# Patient Record
Sex: MALE | Race: WHITE | HISPANIC OR LATINO | URBAN - METROPOLITAN AREA
[De-identification: names, ages, dates, MRNs, and addresses within clinical notes are randomized per-mention and may not be internally consistent; named-entity substitution may affect disease eponyms.]

---

## 2018-01-26 ENCOUNTER — INPATIENT (INPATIENT)
Facility: HOSPITAL | Age: 83
LOS: 3 days | Discharge: HOME | DRG: 195 | End: 2018-01-30
Attending: HOSPITALIST | Admitting: INTERNAL MEDICINE
Payer: MEDICARE

## 2018-01-26 VITALS
TEMPERATURE: 99 F | HEART RATE: 88 BPM | DIASTOLIC BLOOD PRESSURE: 78 MMHG | RESPIRATION RATE: 18 BRPM | OXYGEN SATURATION: 96 % | SYSTOLIC BLOOD PRESSURE: 143 MMHG

## 2018-01-26 LAB
BASOPHILS # BLD AUTO: 0 K/UL — SIGNIFICANT CHANGE UP (ref 0–0.2)
BASOPHILS NFR BLD AUTO: 0.1 % — SIGNIFICANT CHANGE UP (ref 0–2)
EOSINOPHIL # BLD AUTO: 0 K/UL — SIGNIFICANT CHANGE UP (ref 0–0.5)
EOSINOPHIL NFR BLD AUTO: 0.1 % — SIGNIFICANT CHANGE UP (ref 0–6)
GAS PNL BLDV: SIGNIFICANT CHANGE UP
HCT VFR BLD CALC: 36.8 % — LOW (ref 39–50)
HGB BLD-MCNC: 12.7 G/DL — LOW (ref 13–17)
LYMPHOCYTES # BLD AUTO: 3.2 K/UL — SIGNIFICANT CHANGE UP (ref 1–3.3)
LYMPHOCYTES # BLD AUTO: 38.9 % — SIGNIFICANT CHANGE UP (ref 13–44)
MCHC RBC-ENTMCNC: 32.5 PG — SIGNIFICANT CHANGE UP (ref 27–34)
MCHC RBC-ENTMCNC: 34.5 GM/DL — SIGNIFICANT CHANGE UP (ref 32–36)
MCV RBC AUTO: 94.2 FL — SIGNIFICANT CHANGE UP (ref 80–100)
MONOCYTES # BLD AUTO: 1.3 K/UL — HIGH (ref 0–0.9)
MONOCYTES NFR BLD AUTO: 15.9 % — HIGH (ref 2–14)
NEUTROPHILS # BLD AUTO: 3.7 K/UL — SIGNIFICANT CHANGE UP (ref 1.8–7.4)
NEUTROPHILS NFR BLD AUTO: 45 % — SIGNIFICANT CHANGE UP (ref 43–77)
PLATELET # BLD AUTO: 180 K/UL — SIGNIFICANT CHANGE UP (ref 150–400)
RBC # BLD: 3.91 M/UL — LOW (ref 4.2–5.8)
RBC # FLD: 12.3 % — SIGNIFICANT CHANGE UP (ref 10.3–14.5)
WBC # BLD: 8.2 K/UL — SIGNIFICANT CHANGE UP (ref 3.8–10.5)
WBC # FLD AUTO: 8.2 K/UL — SIGNIFICANT CHANGE UP (ref 3.8–10.5)

## 2018-01-26 PROCEDURE — 99285 EMERGENCY DEPT VISIT HI MDM: CPT | Mod: 25

## 2018-01-26 PROCEDURE — 71046 X-RAY EXAM CHEST 2 VIEWS: CPT | Mod: 26

## 2018-01-26 PROCEDURE — 93010 ELECTROCARDIOGRAM REPORT: CPT

## 2018-01-26 RX ORDER — DEXTROSE 50 % IN WATER 50 %
50 SYRINGE (ML) INTRAVENOUS ONCE
Qty: 0 | Refills: 0 | Status: COMPLETED | OUTPATIENT
Start: 2018-01-26 | End: 2018-01-26

## 2018-01-26 RX ADMIN — Medication 50 MILLILITER(S): at 23:00

## 2018-01-26 NOTE — ED PROVIDER NOTE - NS ED ROS FT
Constitutional: +fever, +chills.  Eyes: no visual changes.  ENMT: no sore throat.  CV: no chest pain.  Resp: +cough, no shortness of breath.  GI: no abdominal pain, no nausea, no vomiting, no diarrhea.  : no dysuria, +hematuria.  MSK: no back pain, no neck pain.  Skin: no rashes.  Neuro: no headache.
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
## 2018-01-26 NOTE — ED PROVIDER NOTE - PROGRESS NOTE DETAILS
Resident: patient had episode of hypoglycemia, FSG 40. Given 1amp D50, repeat 249. Resident: despite encouragement to eat, patient still having decreased PO intake, became hypoglycemia again, finger stick 64. Will admit with flu and hypoglycemia. Repeat BG at 54 s/p D50. Hospitalist at bedside adjusting D5 gtt.

## 2018-01-26 NOTE — ED ADULT NURSE NOTE - PMH
BPH (Benign Prostatic Hypertrophy)    CAD (coronary artery disease)    High Cholesterol    HTN (Hypertension)    Hypothyroid    MI (Myocardial Infarction)

## 2018-01-26 NOTE — ED PROVIDER NOTE - SHIFT CHANGE DETAILS
Pt here with +Flu. Initially hypoglycemic given D50 and fed. Will repeat BG, awaiting UA for dispo planning
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
## 2018-01-26 NOTE — ED PROVIDER NOTE - PHYSICAL EXAMINATION
Resident: Gen: well appearing, of stated age, no acute distress; ENT: PERRL, MMM, no uvular deviation, no tonsilar erythema; Chest: diffuse wheezes, rhonchi RLL, no retractions, rate normal, appears to breathe comfortably; Heart: RRR S1S2 No JVD +mild pitting LE edema to mid shin, b/l pulses 2+ in arms and legs; Abd: Soft non-tender, no rebound or guarding, no CVAT; Back: No spinal deformity; Ext: Moving all 4 extremities without obvious impairment to ROM, no obvious weakness; Neuro: fluid speech; Psych: No anxiety, depression or pressured speech noted; Skin: no urticaria, no diffuse rash.

## 2018-01-26 NOTE — ED ADULT NURSE NOTE - PSH
History of coronary angioplasty  with balloon angioplasty, no stent in 1993 and 1995  S/P total knee replacement  Bilateral, right knee 7/2011 and left knee 10/2011

## 2018-01-26 NOTE — ED PROVIDER NOTE - ATTENDING CONTRIBUTION TO CARE
Attending MD Thao: I personally have seen and examined this patient.  Resident note reviewed and agree on plan of care and except where noted.  See below for details.     84M with PMH including CAD s/p angio in 2011 no stents, HTN, DM, HLD, BPH presents to the ED with cough and muscle pain.  Reports he has had nonproductive cough for 4 days, associated with fever, bilateral shoulder pain, generalized muscle pain and decreased appetite and chills.  Reports wife with similar symptoms.  Denies recent travel.  Reports he is chronic prednisone 10mg daily (rheum).  Denies abdominal pain, nausea, vomiting, diarrhea, blood in stools. Denies dysuria, hematuria, change in urinary habits including frequency, urgency. Denies chest pain, palpitations.  On exam, NAD, head NCAT, PERRL, FROM at neck, no tenderness to palpation or stepoffs along length of spine, lungs RLL crackles, scattered rhonchi, good inspiratory effort, +S1S2, no m/r/g, abdomen soft with +BS, NT, ND, no CVAT, moving all extremities with 5/5 strength bilateral upper and lower extremities, good and equal  strength bilaterally, +trace pitting edema bilaterally, no rash; A/P: 84M with cough, Ddx viral illness, PNA, will obtain CXR, RVP, labs, EKG, reassess

## 2018-01-27 DIAGNOSIS — E11.649 TYPE 2 DIABETES MELLITUS WITH HYPOGLYCEMIA WITHOUT COMA: ICD-10-CM

## 2018-01-27 DIAGNOSIS — I10 ESSENTIAL (PRIMARY) HYPERTENSION: ICD-10-CM

## 2018-01-27 DIAGNOSIS — E16.2 HYPOGLYCEMIA, UNSPECIFIED: ICD-10-CM

## 2018-01-27 DIAGNOSIS — Z29.9 ENCOUNTER FOR PROPHYLACTIC MEASURES, UNSPECIFIED: ICD-10-CM

## 2018-01-27 DIAGNOSIS — E03.9 HYPOTHYROIDISM, UNSPECIFIED: ICD-10-CM

## 2018-01-27 DIAGNOSIS — J10.1 INFLUENZA DUE TO OTHER IDENTIFIED INFLUENZA VIRUS WITH OTHER RESPIRATORY MANIFESTATIONS: ICD-10-CM

## 2018-01-27 DIAGNOSIS — E78.5 HYPERLIPIDEMIA, UNSPECIFIED: ICD-10-CM

## 2018-01-27 DIAGNOSIS — J11.1 INFLUENZA DUE TO UNIDENTIFIED INFLUENZA VIRUS WITH OTHER RESPIRATORY MANIFESTATIONS: ICD-10-CM

## 2018-01-27 DIAGNOSIS — N40.0 BENIGN PROSTATIC HYPERPLASIA WITHOUT LOWER URINARY TRACT SYMPTOMS: ICD-10-CM

## 2018-01-27 DIAGNOSIS — I25.10 ATHEROSCLEROTIC HEART DISEASE OF NATIVE CORONARY ARTERY WITHOUT ANGINA PECTORIS: ICD-10-CM

## 2018-01-27 LAB
ANION GAP SERPL CALC-SCNC: 12 MMOL/L — SIGNIFICANT CHANGE UP (ref 5–17)
APPEARANCE UR: CLEAR — SIGNIFICANT CHANGE UP
BASOPHILS # BLD AUTO: 0.01 K/UL — SIGNIFICANT CHANGE UP (ref 0–0.2)
BASOPHILS NFR BLD AUTO: 0.2 % — SIGNIFICANT CHANGE UP (ref 0–2)
BILIRUB UR-MCNC: NEGATIVE — SIGNIFICANT CHANGE UP
BUN SERPL-MCNC: 19 MG/DL — SIGNIFICANT CHANGE UP (ref 7–23)
BURR CELLS BLD QL SMEAR: SIGNIFICANT CHANGE UP
CALCIUM SERPL-MCNC: 8.3 MG/DL — LOW (ref 8.4–10.5)
CHLORIDE SERPL-SCNC: 104 MMOL/L — SIGNIFICANT CHANGE UP (ref 96–108)
CO2 SERPL-SCNC: 22 MMOL/L — SIGNIFICANT CHANGE UP (ref 22–31)
COLOR SPEC: SIGNIFICANT CHANGE UP
CREAT SERPL-MCNC: 1.12 MG/DL — SIGNIFICANT CHANGE UP (ref 0.5–1.3)
DIFF PNL FLD: ABNORMAL
ELLIPTOCYTES BLD QL SMEAR: SIGNIFICANT CHANGE UP
EOSINOPHIL # BLD AUTO: 0.02 K/UL — SIGNIFICANT CHANGE UP (ref 0–0.5)
EOSINOPHIL NFR BLD AUTO: 0.4 % — SIGNIFICANT CHANGE UP (ref 0–6)
EPI CELLS # UR: SIGNIFICANT CHANGE UP /HPF
FLUAV H1 2009 PAND RNA SPEC QL NAA+PROBE: DETECTED
GLUCOSE BLDC GLUCOMTR-MCNC: 126 MG/DL — HIGH (ref 70–99)
GLUCOSE BLDC GLUCOMTR-MCNC: 146 MG/DL — HIGH (ref 70–99)
GLUCOSE BLDC GLUCOMTR-MCNC: 147 MG/DL — HIGH (ref 70–99)
GLUCOSE SERPL-MCNC: 56 MG/DL — LOW (ref 70–99)
GLUCOSE UR QL: NEGATIVE — SIGNIFICANT CHANGE UP
HBA1C BLD-MCNC: 6.4 % — HIGH (ref 4–5.6)
HCT VFR BLD CALC: 36.3 % — LOW (ref 39–50)
HGB BLD-MCNC: 11.8 G/DL — LOW (ref 13–17)
IMM GRANULOCYTES NFR BLD AUTO: 0.2 % — SIGNIFICANT CHANGE UP (ref 0–1.5)
KETONES UR-MCNC: NEGATIVE — SIGNIFICANT CHANGE UP
LEUKOCYTE ESTERASE UR-ACNC: ABNORMAL
LYMPHOCYTES # BLD AUTO: 2.08 K/UL — SIGNIFICANT CHANGE UP (ref 1–3.3)
LYMPHOCYTES # BLD AUTO: 41.3 % — SIGNIFICANT CHANGE UP (ref 13–44)
MAGNESIUM SERPL-MCNC: 2 MG/DL — SIGNIFICANT CHANGE UP (ref 1.6–2.6)
MANUAL SMEAR VERIFICATION: SIGNIFICANT CHANGE UP
MCHC RBC-ENTMCNC: 30.2 PG — SIGNIFICANT CHANGE UP (ref 27–34)
MCHC RBC-ENTMCNC: 32.5 GM/DL — SIGNIFICANT CHANGE UP (ref 32–36)
MCV RBC AUTO: 92.8 FL — SIGNIFICANT CHANGE UP (ref 80–100)
MONOCYTES # BLD AUTO: 1.01 K/UL — HIGH (ref 0–0.9)
MONOCYTES NFR BLD AUTO: 20 % — HIGH (ref 2–14)
NEUTROPHILS # BLD AUTO: 1.91 K/UL — SIGNIFICANT CHANGE UP (ref 1.8–7.4)
NEUTROPHILS NFR BLD AUTO: 37.9 % — LOW (ref 43–77)
NITRITE UR-MCNC: NEGATIVE — SIGNIFICANT CHANGE UP
PH UR: 6 — SIGNIFICANT CHANGE UP (ref 5–8)
PHOSPHATE SERPL-MCNC: 2.8 MG/DL — SIGNIFICANT CHANGE UP (ref 2.5–4.5)
PLAT MORPH BLD: NORMAL — SIGNIFICANT CHANGE UP
PLATELET # BLD AUTO: 189 K/UL — SIGNIFICANT CHANGE UP (ref 150–400)
POIKILOCYTOSIS BLD QL AUTO: SIGNIFICANT CHANGE UP
POTASSIUM SERPL-MCNC: 4 MMOL/L — SIGNIFICANT CHANGE UP (ref 3.5–5.3)
POTASSIUM SERPL-SCNC: 4 MMOL/L — SIGNIFICANT CHANGE UP (ref 3.5–5.3)
PROT UR-MCNC: SIGNIFICANT CHANGE UP
RAPID RVP RESULT: DETECTED
RBC # BLD: 3.91 M/UL — LOW (ref 4.2–5.8)
RBC # FLD: 14.1 % — SIGNIFICANT CHANGE UP (ref 10.3–14.5)
RBC BLD AUTO: ABNORMAL
RBC CASTS # UR COMP ASSIST: SIGNIFICANT CHANGE UP /HPF (ref 0–2)
SODIUM SERPL-SCNC: 138 MMOL/L — SIGNIFICANT CHANGE UP (ref 135–145)
SP GR SPEC: 1.01 — SIGNIFICANT CHANGE UP (ref 1.01–1.02)
UROBILINOGEN FLD QL: NEGATIVE — SIGNIFICANT CHANGE UP
WBC # BLD: 5.04 K/UL — SIGNIFICANT CHANGE UP (ref 3.8–10.5)
WBC # FLD AUTO: 5.04 K/UL — SIGNIFICANT CHANGE UP (ref 3.8–10.5)
WBC UR QL: SIGNIFICANT CHANGE UP /HPF (ref 0–5)

## 2018-01-27 PROCEDURE — 99223 1ST HOSP IP/OBS HIGH 75: CPT | Mod: GC

## 2018-01-27 PROCEDURE — 99233 SBSQ HOSP IP/OBS HIGH 50: CPT | Mod: GC

## 2018-01-27 PROCEDURE — 99222 1ST HOSP IP/OBS MODERATE 55: CPT

## 2018-01-27 PROCEDURE — 12345: CPT | Mod: NC

## 2018-01-27 RX ORDER — DEXTROSE 10 % IN WATER 10 %
1000 INTRAVENOUS SOLUTION INTRAVENOUS
Qty: 0 | Refills: 0 | Status: DISCONTINUED | OUTPATIENT
Start: 2018-01-27 | End: 2018-01-27

## 2018-01-27 RX ORDER — IPRATROPIUM/ALBUTEROL SULFATE 18-103MCG
3 AEROSOL WITH ADAPTER (GRAM) INHALATION EVERY 6 HOURS
Qty: 0 | Refills: 0 | Status: DISCONTINUED | OUTPATIENT
Start: 2018-01-27 | End: 2018-01-30

## 2018-01-27 RX ORDER — HEPARIN SODIUM 5000 [USP'U]/ML
5000 INJECTION INTRAVENOUS; SUBCUTANEOUS EVERY 8 HOURS
Qty: 0 | Refills: 0 | Status: DISCONTINUED | OUTPATIENT
Start: 2018-01-27 | End: 2018-01-30

## 2018-01-27 RX ORDER — DEXTROSE 10 % IN WATER 10 %
500 INTRAVENOUS SOLUTION INTRAVENOUS
Qty: 0 | Refills: 0 | Status: DISCONTINUED | OUTPATIENT
Start: 2018-01-27 | End: 2018-01-27

## 2018-01-27 RX ORDER — ATORVASTATIN CALCIUM 80 MG/1
10 TABLET, FILM COATED ORAL AT BEDTIME
Qty: 0 | Refills: 0 | Status: DISCONTINUED | OUTPATIENT
Start: 2018-01-27 | End: 2018-01-30

## 2018-01-27 RX ORDER — DEXTROSE 50 % IN WATER 50 %
1 SYRINGE (ML) INTRAVENOUS ONCE
Qty: 0 | Refills: 0 | Status: DISCONTINUED | OUTPATIENT
Start: 2018-01-27 | End: 2018-01-30

## 2018-01-27 RX ORDER — SODIUM CHLORIDE 9 MG/ML
1000 INJECTION, SOLUTION INTRAVENOUS
Qty: 0 | Refills: 0 | Status: DISCONTINUED | OUTPATIENT
Start: 2018-01-27 | End: 2018-01-27

## 2018-01-27 RX ORDER — GLUCAGON INJECTION, SOLUTION 0.5 MG/.1ML
1 INJECTION, SOLUTION SUBCUTANEOUS ONCE
Qty: 0 | Refills: 0 | Status: DISCONTINUED | OUTPATIENT
Start: 2018-01-27 | End: 2018-01-30

## 2018-01-27 RX ORDER — FINASTERIDE 5 MG/1
5 TABLET, FILM COATED ORAL DAILY
Qty: 0 | Refills: 0 | Status: DISCONTINUED | OUTPATIENT
Start: 2018-01-27 | End: 2018-01-30

## 2018-01-27 RX ORDER — TAMSULOSIN HYDROCHLORIDE 0.4 MG/1
0.8 CAPSULE ORAL AT BEDTIME
Qty: 0 | Refills: 0 | Status: DISCONTINUED | OUTPATIENT
Start: 2018-01-27 | End: 2018-01-30

## 2018-01-27 RX ORDER — SODIUM CHLORIDE 9 MG/ML
1000 INJECTION, SOLUTION INTRAVENOUS
Qty: 0 | Refills: 0 | Status: DISCONTINUED | OUTPATIENT
Start: 2018-01-27 | End: 2018-01-28

## 2018-01-27 RX ORDER — DEXTROSE 50 % IN WATER 50 %
1 SYRINGE (ML) INTRAVENOUS ONCE
Qty: 0 | Refills: 0 | Status: COMPLETED | OUTPATIENT
Start: 2018-01-27 | End: 2018-01-27

## 2018-01-27 RX ORDER — GLIMEPIRIDE 1 MG
1 TABLET ORAL
Qty: 0 | Refills: 0 | COMMUNITY

## 2018-01-27 RX ORDER — DEXTROSE 50 % IN WATER 50 %
25 SYRINGE (ML) INTRAVENOUS ONCE
Qty: 0 | Refills: 0 | Status: DISCONTINUED | OUTPATIENT
Start: 2018-01-27 | End: 2018-01-30

## 2018-01-27 RX ORDER — DEXTROSE 50 % IN WATER 50 %
50 SYRINGE (ML) INTRAVENOUS ONCE
Qty: 0 | Refills: 0 | Status: COMPLETED | OUTPATIENT
Start: 2018-01-27 | End: 2018-01-27

## 2018-01-27 RX ORDER — FAMOTIDINE 10 MG/ML
20 INJECTION INTRAVENOUS AT BEDTIME
Qty: 0 | Refills: 0 | Status: DISCONTINUED | OUTPATIENT
Start: 2018-01-27 | End: 2018-01-30

## 2018-01-27 RX ORDER — LEVOTHYROXINE SODIUM 125 MCG
125 TABLET ORAL DAILY
Qty: 0 | Refills: 0 | Status: DISCONTINUED | OUTPATIENT
Start: 2018-01-27 | End: 2018-01-30

## 2018-01-27 RX ORDER — DEXTROSE 50 % IN WATER 50 %
12.5 SYRINGE (ML) INTRAVENOUS ONCE
Qty: 0 | Refills: 0 | Status: DISCONTINUED | OUTPATIENT
Start: 2018-01-27 | End: 2018-01-30

## 2018-01-27 RX ORDER — ASPIRIN/CALCIUM CARB/MAGNESIUM 324 MG
81 TABLET ORAL DAILY
Qty: 0 | Refills: 0 | Status: DISCONTINUED | OUTPATIENT
Start: 2018-01-27 | End: 2018-01-30

## 2018-01-27 RX ORDER — ACETAMINOPHEN 500 MG
650 TABLET ORAL EVERY 6 HOURS
Qty: 0 | Refills: 0 | Status: DISCONTINUED | OUTPATIENT
Start: 2018-01-27 | End: 2018-01-30

## 2018-01-27 RX ADMIN — Medication 50 MILLILITER(S): at 04:40

## 2018-01-27 RX ADMIN — Medication 81 MILLIGRAM(S): at 14:34

## 2018-01-27 RX ADMIN — Medication 1 DOSE(S): at 06:20

## 2018-01-27 RX ADMIN — Medication 40 MILLILITER(S): at 14:35

## 2018-01-27 RX ADMIN — ATORVASTATIN CALCIUM 10 MILLIGRAM(S): 80 TABLET, FILM COATED ORAL at 22:42

## 2018-01-27 RX ADMIN — Medication 30 MILLIGRAM(S): at 06:57

## 2018-01-27 RX ADMIN — Medication 50 MILLILITER(S): at 06:40

## 2018-01-27 RX ADMIN — SODIUM CHLORIDE 125 MILLILITER(S): 9 INJECTION, SOLUTION INTRAVENOUS at 03:36

## 2018-01-27 RX ADMIN — Medication 5 MILLIGRAM(S): at 06:02

## 2018-01-27 RX ADMIN — Medication 30 MILLIGRAM(S): at 17:56

## 2018-01-27 RX ADMIN — FINASTERIDE 5 MILLIGRAM(S): 5 TABLET, FILM COATED ORAL at 14:34

## 2018-01-27 RX ADMIN — TAMSULOSIN HYDROCHLORIDE 0.8 MILLIGRAM(S): 0.4 CAPSULE ORAL at 22:42

## 2018-01-27 RX ADMIN — HEPARIN SODIUM 5000 UNIT(S): 5000 INJECTION INTRAVENOUS; SUBCUTANEOUS at 14:34

## 2018-01-27 RX ADMIN — HEPARIN SODIUM 5000 UNIT(S): 5000 INJECTION INTRAVENOUS; SUBCUTANEOUS at 22:42

## 2018-01-27 RX ADMIN — Medication 125 MICROGRAM(S): at 06:02

## 2018-01-27 NOTE — ED ADULT NURSE REASSESSMENT NOTE - TEMPLATE LIST FOR HEAD TO TOE ASSESSMENT
Communicable/Infectious

## 2018-01-27 NOTE — H&P ADULT - PROBLEM SELECTOR PLAN 3
Monitor vitals  SBP in the 110s.   Will hold with AM HTN meds (Amlodipine/Atenolol/Valsartan) pending improved hemodynamic stability. Monitor vitals  SBP in the 110s. concern for sepsis in setting of hypoglycemia, however BP stable,  Will hold with AM HTN meds (Amlodipine/Atenolol/Valsartan) pending improved hemodynamic stability. Monitor vitals  SBP in the 110s. concern for sepsis in setting of hypoglycemia, however BP stable, not tachycardic, no gross leukocytosis.  Will hold with AM HTN meds (Amlodipine/Atenolol/Valsartan) pending improved hemodynamic stability.

## 2018-01-27 NOTE — CONSULT NOTE ADULT - ATTENDING COMMENTS
84M with Hx Type-2 DM CAD and BPH on prednisone 5mg GD presented to ED with flu like symptoms 4 days found to have IFA+ and hypoglycemia required D50 x 2 amp IV push and starting on D10 gtt low dose 30 -50 cc/hr. He took Metformin 500 mg and Glimepiride 2 mg BID last evening with poor appetite X 4 days in setting of reduced GFR < 60. NO significant hypoglycemic symptoms noted.
elderly patient with hypoglycemia in the setting of having the flu and on amaryl last dose yesterday in am  pt is elderly so would no aim for tight control  fs have been normal on d10 can change to d5 and cont to monitor effect of amaryl should not last more than 48 hours  pt has good appetite  cont to monitor fs and can discharge on metformin  discussed with pt and family that amaryl is not a safe medication for him

## 2018-01-27 NOTE — ED ADULT NURSE REASSESSMENT NOTE - NS ED NURSE REASSESS COMMENT FT1
md pozo aware of patients trending finger sticks- patient to be admitted
I spoke to Medicine EMILY Thomas to discuss patient's persistent hypoglycemia. The D10 infusion had been ordered for 5 hours only, however despite D10 IV and PO intake, patient POCT blood glucose remaining at ~80. EMILY Thomas states to continue D10 infusion and q2hr finger sticks.
MD Greer states next finger stick to be drawn at 0600
MD Lowe aware that carboxyhemoglobin was not obtained and that there was an error in the lab. She states she does not want lab.
MICU resident came to ED stating that they have to trial D10 for 1 hour. Next fingerstick due at 0740. Will endorse time to receiving RN.
Patient was given food.
Patient was hypoglycemic again. MD ordered stat d50. Flushed IV- no redness/swelling prior or post infiltration. Admitting MD aware of patients BGL and still at bedside.
Patients lab work revealed hypoglycemia. he denies dizziness/lightheadedness. 1 amp d50 administered emergently by HOLLY Ontiveros. Will repeat serial BGLs.
Repeat POCT blood glucose 79. Patient resting comfortably. Will repeat POCT blood glucose in 2 hours (q2 hours x4 hours) as per Dr Greer's order. D10 infusing. Will provide breakfast when trays arrive.
Repeat POCT blood glucose: 84
VJ Michael aware that Nursing supervisor states current FS orders are not appropriate for the floor. MD states the orders  are appropriate. Orders will be carried out while in ED. MDs and NP aware that it is a possibility that the floor will not be able to accommodate the frequency of fingersticks. Next FS due at 0740. Angelica quintanilla RN aware.
admitting MD at bedside
meal provided
patient had FS of 56. at 0535. Given 1 amp d50 at 0540. Repeat FS 15 mins later 167. BGL not crossing over from machine.
repeat FS after oral glucose is 74. Patient was changed to d10. He is alert, denies N/V/dizziness/diaphoresis.
repeat FS was 65. MD Greer was called immediately. Hypoglycemia protocol initiated, and patient given oral glucose. She changed order to D10. Will repeat finger sticks for patient. Pharmacy called for D10 order. Will re evaluate patients FS as per MD order.
s/w lab regarding patients BGL not crossing over- states they are not involved in that process.
Received report from night RN Ally Cuellar. Patient admitted to med/surg floor, MICU consult in progress regarding patients hypoglycemia, will repeat finger stick at 0740. D10 infusing. Awaiting bed assignment. Patient currently resting comfortably, A&Ox4. Reporting mild body aches. Droplet precautions maintained. Son at bedside.

## 2018-01-27 NOTE — CONSULT NOTE ADULT - ASSESSMENT
85 y/o M PMH HTN, HLD, DM2 on oral agents, hypothyroidism presents with hypoglycemia in setting of sulfonylurea use with reduced po intake

## 2018-01-27 NOTE — H&P ADULT - HISTORY OF PRESENT ILLNESS
83 yo man with PM of CAD (s/p angioplasty no stent 2011), HTN, HLD, BPH, on chronic prednisone presenting from home in setting of cough, fever, body aches. 85 yo man with PM of CAD (s/p angioplasty no stent), HTN, HLD, BPH, on chronic prednisone presenting from home in setting of cough, fever, body aches and generalized fatigue in the past 4 days. Patient endorses symptoms of fevers (as high as 101) chills, sweats, nasal congestion sore throat and fairly nonproductive cough, myalgias the past 4 days. Patient states that his wife has been under the weather and presented to the ED as well earlier in the evening with patient and she was found with flu. Patient can not recall any other sick contacts besides going to his regular doctor's visits. Patient received flu shot in 10/2017. Patient also endorses a decreased appetite. Patient denies n/v/diarhhea. Took Metformin and Glimepiride BID prior to coming the ED

## 2018-01-27 NOTE — H&P ADULT - PROBLEM SELECTOR PLAN 2
Influenza A detected on RVP  Initiate Tamiflu 30 mg BID based on CrCl with approximation of patient's weight and height  Primary day team to ensure adequate dosing pending patient's official CrCl  Supportive care with antipyretics and antitussives prn Influenza A detected on RVP  Initiate Tamiflu 30 mg BID based on CrCl with approximation of patient's weight and height  Primary day team to ensure adequate dosing pending patient's official CrCl  Supportive care with antipyretics, antitussives, nebs prn  Of note patient on Prednisone 5 mg for chronic back pain 2/2 muscle issue yet no rheumatologic diagnosis. Will continue for now  Primary day team to obtain collateral from PMD

## 2018-01-27 NOTE — H&P ADULT - ATTENDING COMMENTS
Patient was previously unknown to me. Patient was assigned to me by hospitalist in charge. My involvement in this case consisted of initial history, physical and management plan. Primary day team to assume care in AM. Case discussed in detail with overnight medicine NP, Patient was previously unknown to me. Patient was assigned to me by hospitalist in charge. My involvement in this case consisted of initial history, physical and management plan. Primary day team to assume care in AM. Case discussed in detail with overnight medicine NP, Fernanda [id number].

## 2018-01-27 NOTE — H&P ADULT - NSHPPHYSICALEXAM_GEN_ALL_CORE
Vital Signs Last 24 Hrs  T(C): 37.4 (27 Jan 2018 03:16), Max: 37.4 (27 Jan 2018 03:16)  T(F): 99.4 (27 Jan 2018 03:16), Max: 99.4 (27 Jan 2018 03:16)  HR: 64 (27 Jan 2018 03:16) (64 - 88)  BP: 116/72 (27 Jan 2018 03:16) (116/72 - 143/78)  BP(mean): --  RR: 18 (27 Jan 2018 03:16) (18 - 18)  SpO2: 96% (27 Jan 2018 03:16) (95% - 98%) Vital Signs Last 24 Hrs  T(C): 37.4 (27 Jan 2018 03:16), Max: 37.4 (27 Jan 2018 03:16)  T(F): 99.4 (27 Jan 2018 03:16), Max: 99.4 (27 Jan 2018 03:16)  HR: 64 (27 Jan 2018 03:16) (64 - 88)  BP: 116/72 (27 Jan 2018 03:16) (116/72 - 143/78)  BP(mean): --  RR: 18 (27 Jan 2018 03:16) (18 - 18)  SpO2: 96% (27 Jan 2018 03:16) (95% - 98%)    CAPILLARY BLOOD GLUCOSE      POCT at bedside 58 ~0430 AM. Given Amp D50 x1    POCT Blood Glucose.: 64 mg/dL (27 Jan 2018 03:14)  POCT Blood Glucose.: 172 mg/dL (26 Jan 2018 23:23)  POCT Blood Glucose.: 246 mg/dL (26 Jan 2018 22:49)  POCT Blood Glucose.: >600 mg/dL (26 Jan 2018 22:46)  POCT Blood Glucose.: 40 mg/dL (26 Jan 2018 22:43)        PHYSICAL EXAM:  GENERAL: NAD, well-groomed, well-developed  HEAD:  Atraumatic, Normocephalic  EYES: EOMI, PERRLA, conjunctiva and sclera clear  ENMT: No tonsillar erythema, exudates, or enlargement; Moist mucous membranes, Good dentition, No lesions  NECK: Supple, No JVD, Normal thyroid  NERVOUS SYSTEM:  Alert & Oriented X3, Good concentration; Motor Strength 5/5 B/L upper and lower extremities  CHEST/LUNG: Respirations non labored, no use of accessory muscles. Scant ronchi, no appreciable wheezing. Intermittent cough  HEART: Regular rate and rhythm; No murmurs, rubs, or gallops  ABDOMEN: Soft, Nontender, Nondistended; Bowel sounds present  EXTREMITIES:  2+ Peripheral Pulses, No clubbing, cyanosis, or trace LE edema  LYMPH: No lymphadenopathy noted  SKIN: No rashes or lesions

## 2018-01-27 NOTE — H&P ADULT - NSHPLABSRESULTS_GEN_ALL_CORE
Personally reviewed labs and noted in detail below:    Personally reviwed EKG    Personally reviewed CXR no appreiciable consolidations Personally reviewed labs and noted in detail below:    Personally reviewed EKG: SR 71 bpm comparable to 1/2012 EKG    Personally reviewed CXR no appreciable consolidations

## 2018-01-27 NOTE — H&P ADULT - ASSESSMENT
83 yo man with PM of CAD (s/p angioplasty no stent), HTN, HLD, BPH, on chronic prednisone presenting from home in setting of cough, fever, body aches and generalized fatigue in the past 4 days found with Influenza A and with hypoglycemia.

## 2018-01-27 NOTE — CONSULT NOTE ADULT - PROBLEM SELECTOR RECOMMENDATION 9
- 2/2 no PO intake over last 4 days with persistent adherence to metformin/glyburide regimen irregardless of PO intake.    - start D10 @40-50c/hr, check FSG q1 x 2  - if patient required increasing D10 levels at that point, may need ICU monitoring but we will follow up FSG over next 1-2 hours  - hold all diabetics medications at this time  - encourage full diet  - hypoglycemia protocol in effect - 2/2 no PO intake over last 4 days with persistent adherence to metformin/glyburide regimen irregardless of PO intake.    - start D10 @40-50c/hr, check FSG q1 x 2  - if patient requires increasing D10 levels at that point, may need ICU monitoring but we will follow up FSG over next 1-2 hours  - hold all diabetics medications at this time  - encourage full diet  - hypoglycemia protocol in effect

## 2018-01-27 NOTE — CONSULT NOTE ADULT - SUBJECTIVE AND OBJECTIVE BOX
HPI:  83 yo man with PM of CAD (s/p angioplasty no stent), HTN, HLD, DM2, BPH, on chronic prednisone presenting from home in setting of cough, fever, body aches and generalized fatigue in the past 4 days. Patient endorses symptoms of fevers (as high as 101) chills, sweats, nasal congestion sore throat and fairly nonproductive cough, myalgias the past 4 days. Patient states that his wife has been under the weather and presented to the ED as well earlier in the evening with patient and she was found with flu. Patient can not recall any other sick contacts besides going to his regular doctor's visits. Patient received flu shot in 10/2017. Patient also endorses a decreased appetite. Patient denies n/v/diarhhea. Took Metformin and Glimepiride BID prior to coming the ED.    Endocrine History:  Patient was found to be hypoglycemic to 40's on presentation to the ED, not symptomatic. He was treated with 1 amp of D50 and then developed hyperglycemia. He was started on D5 and then D10 overnight. He states that he was diagnosed with DM2 last year and was started on metformin 500 mg BID glimeperide 2 mg BID by his PMD. HbA1c here is 6.4. He states his last dose of glimepiride was yesterday morning at 9:30 AM. For the last 4 days, he was not feeling well and was not eating. Appetite now improved and he is eating at bedside. As per patient, most recent FS was 177. Saw optho 3 months ago, no retinopathy. Patient with CKD, stage 2. Reports neuropathy in the feet for the last 5-6 months. States he only checks a FS once a month, in the AM, usually in the 80's. Reports eating a reasonable amount of rice and pasta. Drinks orange juice.     Patient also with hypothyroidism for years. States he takes levothyroxine at home, but is unsure of the dose. No neck pain, dysphagia, dysphonia, chest pain, palpitations, abdominal pain, nausea, vomiting, diarrhea, constipation, weight changes, skin or hair changes.      PAST MEDICAL & SURGICAL HISTORY:  CAD (coronary artery disease)  Hypothyroid  BPH (Benign Prostatic Hypertrophy)  MI (Myocardial Infarction)  HTN (Hypertension)  High Cholesterol  S/P total knee replacement: Bilateral, right knee 7/2011 and left knee 10/2011  History of coronary angioplasty: with balloon angioplasty, no stent in 1993 and 1995      FAMILY HISTORY:  No family history of DM2      Social History:  No cigarette or alcohol use  Lives with wife    Outpatient Medications:  · 	amLODIPine 10 mg oral tablet: 1 tab(s) orally once a day, Last Dose Taken:    · 	atenolol 25 mg oral tablet: 1 tab(s) orally once a day, Last Dose Taken:    · 	atorvastatin 10 mg oral tablet: 1 tab(s) orally once a day, Last Dose Taken:    · 	azelastine 205.5 mcg/inh (0.15%) nasal spray: 1 spray(s) nasal 2 times a day, As Needed, Last Dose Taken:    · 	famotidine 40 mg oral tablet: 1 tab(s) orally once a day (at bedtime), As Needed, Last Dose Taken:    · 	finasteride 5 mg oral tablet: 1 tab(s) orally once a day, Last Dose Taken:    · 	fluticasone 50 mcg/inh nasal spray: 2 spray(s) nasal once a day, As Needed, Last Dose Taken:    · 	levocetirizine 5 mg oral tablet: 1 tab(s) orally once a day (in the evening), As Needed, Last Dose Taken:    · 	levothyroxine 125 mcg (0.125 mg) oral tablet: 1 tab(s) orally once a day, Last Dose Taken:    · 	tamsulosin 0.4 mg oral capsule: 2 cap(s) orally once a day (at bedtime), Last Dose Taken:    · 	valsartan 320 mg oral tablet: 1 tab(s) orally once a day, Last Dose Taken:    · 	metFORMIN 500 mg oral tablet: orally once a day, Last Dose Taken:    · 	glimepiride 2 mg oral tablet: 1 tab(s) orally once a day, Last Dose Taken:    · 	Aspirin Enteric Coated 81 mg oral delayed release tablet: 1 tab(s) orally once a day, Last Dose Taken:    · 	Sindy 5 mg oral delayed release tablet: 1 tab(s) orally once a day, Last Dose Taken:      MEDICATIONS  (STANDING):  aspirin enteric coated 81 milliGRAM(s) Oral daily  atorvastatin 10 milliGRAM(s) Oral at bedtime  dextrose 10%. 500 milliLiter(s) (50 mL/Hr) IV Continuous <Continuous>  dextrose 10%. 1000 milliLiter(s) (40 mL/Hr) IV Continuous <Continuous>  dextrose 5%. 1000 milliLiter(s) (50 mL/Hr) IV Continuous <Continuous>  dextrose 50% Injectable 12.5 Gram(s) IV Push once  dextrose 50% Injectable 25 Gram(s) IV Push once  dextrose 50% Injectable 25 Gram(s) IV Push once  finasteride 5 milliGRAM(s) Oral daily  heparin  Injectable 5000 Unit(s) SubCutaneous every 8 hours  levothyroxine 125 MICROGram(s) Oral daily  oseltamivir 30 milliGRAM(s) Oral every 12 hours  predniSONE   Tablet 5 milliGRAM(s) Oral daily  tamsulosin 0.8 milliGRAM(s) Oral at bedtime    MEDICATIONS  (PRN):  acetaminophen   Tablet 650 milliGRAM(s) Oral every 6 hours PRN For Temp greater than 38 C (100.4 F)  ALBUTerol/ipratropium for Nebulization 3 milliLiter(s) Nebulizer every 6 hours PRN Shortness of Breath and/or Wheezing  dextrose Gel 1 Dose(s) Oral once PRN Blood Glucose LESS THAN 70 milliGRAM(s)/deciliter  famotidine    Tablet 20 milliGRAM(s) Oral at bedtime PRN Reflux symptoms  glucagon  Injectable 1 milliGRAM(s) IntraMuscular once PRN Glucose LESS THAN 70 milligrams/deciliter  guaiFENesin   Syrup  (Sugar-Free) 200 milliGRAM(s) Oral every 6 hours PRN Cough      Allergies  No Known Allergies          Review of Systems:  Constitutional: No fever; decreased appetite  Eyes: No blurry vision  HEENT: No pain  Cardiovascular: No chest pain, palpitations  Respiratory: No SOB, + cough  GI: No nausea, vomiting, abdominal pain  : No dysuria  Skin: no rash  Psych: no depression  Endocrine: no polyuria, polydipsia      ALL OTHER SYSTEMS REVIEWED AND NEGATIVE    PHYSICAL EXAM:  VITALS: T(C): 37.2 (01-27-18 @ 13:31)  T(F): 98.9 (01-27-18 @ 13:31), Max: 99.4 (01-27-18 @ 03:16)  HR: 62 (01-27-18 @ 13:31) (60 - 88)  BP: 149/77 (01-27-18 @ 13:31) (116/72 - 149/77)  RR:  (17 - 18)  SpO2:  (95% - 98%)  Wt(kg): --  GENERAL: NAD, well-developed  EYES: No proptosis,  anicteric  HEENT:  Atraumatic, Normocephalic  THYROID: Normal size, no palpable nodules  RESPIRATORY: Clear to auscultation bilaterally; No rales, rhonchi, wheezing  CARDIOVASCULAR: Regular rate and rhythm; No murmurs; no peripheral edema  GI: Soft, nontender, non distended, normal bowel sounds  SKIN: Dry, intact, No rashes or lesions  MUSCULOSKELETAL: Full range of motion, normal strength  PSYCH: Alert and oriented x 3, reactive affect, euthymic mood      POCT Blood Glucose.: 64 mg/dL (01-27-18 @ 03:14)  POCT Blood Glucose.: 172 mg/dL (01-26-18 @ 23:23)  POCT Blood Glucose.: 246 mg/dL (01-26-18 @ 22:49)  POCT Blood Glucose.: >600 mg/dL (01-26-18 @ 22:46)  POCT Blood Glucose.: 40 mg/dL (01-26-18 @ 22:43)                          11.8   5.04  )-----------( 189      ( 27 Jan 2018 09:02 )             36.3       01-27    138  |  104  |  19  ----------------------------<  56<L>  4.0   |  22  |  1.12    EGFR if : 70  EGFR if non : 60    Ca    8.3<L>      01-27  Mg     2.0     01-27  Phos  2.8     01-27    TPro  6.9  /  Alb  3.9  /  TBili  0.4  /  DBili  x   /  AST  24  /  ALT  12  /  AlkPhos  57  01-26      Thyroid Function Tests:      Hemoglobin A1C, Whole Blood: 6.4 % <H> [4.0 - 5.6] (01-27-18 @ 09:02)

## 2018-01-27 NOTE — H&P ADULT - PROBLEM SELECTOR PLAN 1
Patient with poor PO intake in setting of Flu and has taken Metformin 500 x1 and Glimeperide 2 mg BID prior to arrival to ED  S/P Given 1 amp D50 and Initiated on D5+NS by ED team once FS noted to be downtrending.   During assessment patient bedside FS 58 and given another amp D50 with repeat BS in 167  Monitor FS q1hr for 2 hour, q2hr for 4 hours in addition for FS qAC and qHS  Will hold oral meds and corrective SSI pending stability of FS  Continue with diet  Hypoglycemia protocol Patient with poor PO intake in setting of Flu and has taken Metformin 500 x1 and Glimeperide 2 mg BID prior to arrival to ED  S/P Given 1 amp D50 and Initiated on D5+NS by ED team once FS noted to be downtrending.   During assessment patient bedside FS 58 and given another amp D50 with repeat BS in 167  Monitor FS q1hr for 2 hour, q2hr for 4 hours in addition for FS qAC and qHS  Will hold oral meds and corrective SSI pending stability of FS  Continue with diet. Resume diabetic diet once FS stablized  Hypoglycemia protocol Patient with poor PO intake in setting of Flu and has taken Metformin 500 x1 and Glimeperide 2 mg BID prior to arrival to ED  S/P Given 1 amp D50 and Initiated on D5+NS by ED team once FS noted to be downtrending.   During my assessment of patient bedside FS 58 and given another amp D50 with repeat BS in 167  Monitor FS q1hr for 2 hour, q2hr for 4 hours in addition for FS qAC and qHS  Will hold oral meds and corrective SSI pending stability of FS  Continue with diet. Resume diabetic diet once FS stablized  Hypoglycemia protocol

## 2018-01-27 NOTE — CONSULT NOTE ADULT - ASSESSMENT
83 yo  male with PMHx of CAD, HTN, HLD, BPH, on chronic prednisone now presented with influenza A and c/b hypoglycemia.         MICU to follow for FSG and then placement.

## 2018-01-27 NOTE — CONSULT NOTE ADULT - SUBJECTIVE AND OBJECTIVE BOX
CHIEF COMPLAINT: hypoglycemia    HPI:  85 yo  male with PMHx of CAD, HTN, HLD, BPH, on chronic prednisone now presented with influenza A and c/b hypoglycemia. Pt take metformin (1x daily) and glyburide (BID) at home and has adhered to his regimen the last week but has not eaten anything 2/2 not feeling well. Today on admission, pt was noted to have FSG 40, 1 amp D50 with repeat FSG 58, given another D50 and , pstarted D5NS with increase FSG to 167. Pt repeat now 64, pt drinking juice and asymptomatic now.    Pt has had no symptoms throughout his admission, no dizziness, n/v, diaphoresis. Pt has a cough which bothers him but no other pertinent positives.    Pt denies any overdose on his meds, no extra doses taken.    PAST MEDICAL & SURGICAL HISTORY:  CAD (coronary artery disease)  Hypothyroid  BPH (Benign Prostatic Hypertrophy)  MI (Myocardial Infarction)  HTN (Hypertension)  High Cholesterol  S/P total knee replacement: Bilateral, right knee 2011 and left knee 10/2011  History of coronary angioplasty: with balloon angioplasty, no stent in  and       FAMILY HISTORY:  No pertinent family history in first degree relatives      SOCIAL HISTORY:  lives at home with his wife, no smoking, social ETOH    Allergies    No Known Allergies    Intolerances        HOME MEDICATIONS:  metformin, glyburide 2mg BID    REVIEW OF SYSTEMS:  Constitutional:+ fevers chills  HEENT: denies visual changes, hearing changes, rhinitis, odynophagia, or dysphagia  Cardiovascular: denies palpitations, chest pain, edema  Respiratory: + cough  Gastrointestinal:+ loss of appetite,  denies N/V/D, abdominal pain, hematochezia, melena  : denies dysuria, hematuria  MSK: denies weakness, joint pain  Skin: denies new rashes or masses  Psych: denies hallucinations, anxiety, depression  Neuro: denies tremors, sensory or motor deficits      OBJECTIVE:  ICU Vital Signs Last 24 Hrs  T(C): 37.2 (2018 06:09), Max: 37.4 (2018 03:16)  T(F): 98.9 (2018 06:09), Max: 99.4 (2018 03:16)  HR: 62 (2018 06:09) (62 - 88)  BP: 135/77 (2018 06:09) (116/72 - 143/78)  BP(mean): --  ABP: --  ABP(mean): --  RR: 18 (2018 06:09) (18 - 18)  SpO2: 97% (2018 06:09) (95% - 98%)        CAPILLARY BLOOD GLUCOSE    POCT at bedside 58 ~0430 AM. Given Amp D50 x1  POCT Blood Glucose.: 64 mg/dL (2018 03:14)  POCT Blood Glucose.: 172 mg/dL (2018 23:23)  POCT Blood Glucose.: 246 mg/dL (2018 22:49)  POCT Blood Glucose.: >600 mg/dL (2018 22:46)  POCT Blood Glucose.: 40 mg/dL (2018 22:43)      PHYSICAL EXAM:  	GENERAL: NAD, well-groomed, well-developed  	HEAD:  Atraumatic, Normocephalic  	EYES: EOMI, PERRLA, conjunctiva and sclera clear  	ENMT: No tonsillar erythema, exudates, or enlargement; Moist mucous membranes, Good dentition, No lesions  	NECK: Supple, No JVD, Normal thyroid  	NERVOUS SYSTEM:  Alert & Oriented X3, Good concentration; Motor Strength 5/5 B/L upper and lower extremities  	CHEST/LUNG: Respirations non labored, no use of accessory muscles. Scant ronchi, no appreciable wheezing. Intermittent cough  	HEART: Regular rate and rhythm; No murmurs, rubs, or gallops  	ABDOMEN: Soft, Nontender, Nondistended; Bowel sounds present  	EXTREMITIES:  2+ Peripheral Pulses, No clubbing, cyanosis, or trace LE edema  	LYMPH: No lymphadenopathy noted  SKIN: No rashes or lesions    HOSPITAL MEDICATIONS:  MEDICATIONS  (STANDING):  aspirin enteric coated 81 milliGRAM(s) Oral daily  atorvastatin 10 milliGRAM(s) Oral at bedtime  dextrose 10%. 500 milliLiter(s) (50 mL/Hr) IV Continuous <Continuous>  dextrose 5%. 1000 milliLiter(s) (50 mL/Hr) IV Continuous <Continuous>  dextrose 50% Injectable 12.5 Gram(s) IV Push once  dextrose 50% Injectable 25 Gram(s) IV Push once  dextrose 50% Injectable 25 Gram(s) IV Push once  finasteride 5 milliGRAM(s) Oral daily  heparin  Injectable 5000 Unit(s) SubCutaneous every 8 hours  levothyroxine 125 MICROGram(s) Oral daily  oseltamivir 30 milliGRAM(s) Oral every 12 hours  predniSONE   Tablet 5 milliGRAM(s) Oral daily  tamsulosin 0.8 milliGRAM(s) Oral at bedtime    MEDICATIONS  (PRN):  acetaminophen   Tablet 650 milliGRAM(s) Oral every 6 hours PRN For Temp greater than 38 C (100.4 F)  ALBUTerol/ipratropium for Nebulization 3 milliLiter(s) Nebulizer every 6 hours PRN Shortness of Breath and/or Wheezing  dextrose Gel 1 Dose(s) Oral once PRN Blood Glucose LESS THAN 70 milliGRAM(s)/deciliter  famotidine    Tablet 20 milliGRAM(s) Oral at bedtime PRN Reflux symptoms  glucagon  Injectable 1 milliGRAM(s) IntraMuscular once PRN Glucose LESS THAN 70 milligrams/deciliter  guaiFENesin   Syrup  (Sugar-Free) 200 milliGRAM(s) Oral every 6 hours PRN Cough      LABS:                        12.7   8.2   )-----------( 180      ( 2018 22:31 )             36.8         136  |  102  |  23  ----------------------------<  41<LL>  4.1   |  21<L>  |  1.27    Ca    8.6      2018 22:32    TPro  6.9  /  Alb  3.9  /  TBili  0.4  /  DBili  x   /  AST  24  /  ALT  12  /  AlkPhos  57        Urinalysis Basic - ( 2018 02:09 )    Color: PL Yellow / Appearance: Clear / S.011 / pH: x  Gluc: x / Ketone: Negative  / Bili: Negative / Urobili: Negative   Blood: x / Protein: Trace / Nitrite: Negative   Leuk Esterase: Moderate / RBC: 3-5 /HPF / WBC 11-25 /HPF   Sq Epi: x / Non Sq Epi: OCC /HPF / Bacteria: x        Venous Blood Gas:   @ 22:31  7.37/40/69/23/92  VBG Lactate: 1.0      MICROBIOLOGY: RVP + influenza A    RADIOLOGY:  [ x] Reviewed and interpreted by me

## 2018-01-27 NOTE — CONSULT NOTE ADULT - PROBLEM SELECTOR RECOMMENDATION 9
- patient with hypoglycemia in setting of reduced po intake combined with glimepiride use  - currently on D10 with appropriate rise in FS (last )  - recommend hold all standing insulin, will defer Octreotide  - can switch to D5 now  - once FS are persistently above 150, can resume low correction sliding scale insulin  qac and qhs  - discharge patient off of sulfonylurea; patient will remain on metformin 500 mg BID only on discharge

## 2018-01-27 NOTE — H&P ADULT - NSHPREVIEWOFSYSTEMS_GEN_ALL_CORE
REVIEW OF SYSTEMS:    CONSTITUTIONAL: SEE HPI  EYES: No eye pain, visual disturbances, or discharge  ENMT:  SEE HPI  NECK: No pain or stiffness  RESPIRATORY: SEE HPI; No shortness of breath  CARDIOVASCULAR: No chest pain, palpitations, dizziness, or leg swelling  GASTROINTESTINAL: No abdominal pain. No nausea, vomiting, or hematemesis; No diarrhea or constipation.   GENITOURINARY: No dysuria, or frequency,  NEUROLOGICAL: No headaches, memory loss, loss of strength, numbness, or tremors  SKIN: No rashes or lesions   MUSCULOSKELETAL: +Myalgias. Chronic back pain and has been prescribed Prednisone for management. No acute joint pain or swelling.  PSYCHIATRIC: No depression, anxiety  HEME/LYMPH: No easy bruising  ALLERGY AND IMMUNOLOGIC: No reactions to medications

## 2018-01-28 DIAGNOSIS — E03.9 HYPOTHYROIDISM, UNSPECIFIED: ICD-10-CM

## 2018-01-28 LAB
ANION GAP SERPL CALC-SCNC: 14 MMOL/L — SIGNIFICANT CHANGE UP (ref 5–17)
BUN SERPL-MCNC: 18 MG/DL — SIGNIFICANT CHANGE UP (ref 7–23)
CALCIUM SERPL-MCNC: 8.3 MG/DL — LOW (ref 8.4–10.5)
CHLORIDE SERPL-SCNC: 102 MMOL/L — SIGNIFICANT CHANGE UP (ref 96–108)
CO2 SERPL-SCNC: 22 MMOL/L — SIGNIFICANT CHANGE UP (ref 22–31)
CREAT SERPL-MCNC: 1.09 MG/DL — SIGNIFICANT CHANGE UP (ref 0.5–1.3)
GLUCOSE BLDC GLUCOMTR-MCNC: 136 MG/DL — HIGH (ref 70–99)
GLUCOSE BLDC GLUCOMTR-MCNC: 140 MG/DL — HIGH (ref 70–99)
GLUCOSE BLDC GLUCOMTR-MCNC: 167 MG/DL — HIGH (ref 70–99)
GLUCOSE BLDC GLUCOMTR-MCNC: 175 MG/DL — HIGH (ref 70–99)
GLUCOSE BLDC GLUCOMTR-MCNC: 177 MG/DL — HIGH (ref 70–99)
GLUCOSE BLDC GLUCOMTR-MCNC: 188 MG/DL — HIGH (ref 70–99)
GLUCOSE BLDC GLUCOMTR-MCNC: 244 MG/DL — HIGH (ref 70–99)
GLUCOSE BLDC GLUCOMTR-MCNC: 58 MG/DL — LOW (ref 70–99)
GLUCOSE BLDC GLUCOMTR-MCNC: 65 MG/DL — LOW (ref 70–99)
GLUCOSE BLDC GLUCOMTR-MCNC: 73 MG/DL — SIGNIFICANT CHANGE UP (ref 70–99)
GLUCOSE BLDC GLUCOMTR-MCNC: 79 MG/DL — SIGNIFICANT CHANGE UP (ref 70–99)
GLUCOSE BLDC GLUCOMTR-MCNC: 80 MG/DL — SIGNIFICANT CHANGE UP (ref 70–99)
GLUCOSE BLDC GLUCOMTR-MCNC: 84 MG/DL — SIGNIFICANT CHANGE UP (ref 70–99)
GLUCOSE SERPL-MCNC: 128 MG/DL — HIGH (ref 70–99)
HCT VFR BLD CALC: 38.7 % — LOW (ref 39–50)
HGB BLD-MCNC: 12.9 G/DL — LOW (ref 13–17)
MCHC RBC-ENTMCNC: 30.6 PG — SIGNIFICANT CHANGE UP (ref 27–34)
MCHC RBC-ENTMCNC: 33.3 GM/DL — SIGNIFICANT CHANGE UP (ref 32–36)
MCV RBC AUTO: 91.9 FL — SIGNIFICANT CHANGE UP (ref 80–100)
PLATELET # BLD AUTO: 172 K/UL — SIGNIFICANT CHANGE UP (ref 150–400)
POTASSIUM SERPL-MCNC: 4.3 MMOL/L — SIGNIFICANT CHANGE UP (ref 3.5–5.3)
POTASSIUM SERPL-SCNC: 4.3 MMOL/L — SIGNIFICANT CHANGE UP (ref 3.5–5.3)
RBC # BLD: 4.21 M/UL — SIGNIFICANT CHANGE UP (ref 4.2–5.8)
RBC # FLD: 13.8 % — SIGNIFICANT CHANGE UP (ref 10.3–14.5)
SODIUM SERPL-SCNC: 138 MMOL/L — SIGNIFICANT CHANGE UP (ref 135–145)
WBC # BLD: 5.57 K/UL — SIGNIFICANT CHANGE UP (ref 3.8–10.5)
WBC # FLD AUTO: 5.57 K/UL — SIGNIFICANT CHANGE UP (ref 3.8–10.5)

## 2018-01-28 PROCEDURE — 99233 SBSQ HOSP IP/OBS HIGH 50: CPT

## 2018-01-28 RX ORDER — GLUCAGON INJECTION, SOLUTION 0.5 MG/.1ML
1 INJECTION, SOLUTION SUBCUTANEOUS ONCE
Qty: 0 | Refills: 0 | Status: DISCONTINUED | OUTPATIENT
Start: 2018-01-28 | End: 2018-01-30

## 2018-01-28 RX ORDER — AMLODIPINE BESYLATE 2.5 MG/1
10 TABLET ORAL DAILY
Qty: 0 | Refills: 0 | Status: DISCONTINUED | OUTPATIENT
Start: 2018-01-28 | End: 2018-01-30

## 2018-01-28 RX ORDER — DEXTROSE 50 % IN WATER 50 %
25 SYRINGE (ML) INTRAVENOUS ONCE
Qty: 0 | Refills: 0 | Status: DISCONTINUED | OUTPATIENT
Start: 2018-01-28 | End: 2018-01-30

## 2018-01-28 RX ORDER — SODIUM CHLORIDE 9 MG/ML
1000 INJECTION, SOLUTION INTRAVENOUS
Qty: 0 | Refills: 0 | Status: DISCONTINUED | OUTPATIENT
Start: 2018-01-28 | End: 2018-01-30

## 2018-01-28 RX ORDER — INSULIN LISPRO 100/ML
VIAL (ML) SUBCUTANEOUS
Qty: 0 | Refills: 0 | Status: DISCONTINUED | OUTPATIENT
Start: 2018-01-28 | End: 2018-01-30

## 2018-01-28 RX ORDER — INSULIN LISPRO 100/ML
2 VIAL (ML) SUBCUTANEOUS ONCE
Qty: 0 | Refills: 0 | Status: COMPLETED | OUTPATIENT
Start: 2018-01-28 | End: 2018-01-28

## 2018-01-28 RX ORDER — DEXTROSE 50 % IN WATER 50 %
12.5 SYRINGE (ML) INTRAVENOUS ONCE
Qty: 0 | Refills: 0 | Status: DISCONTINUED | OUTPATIENT
Start: 2018-01-28 | End: 2018-01-30

## 2018-01-28 RX ORDER — DEXTROSE 50 % IN WATER 50 %
1 SYRINGE (ML) INTRAVENOUS ONCE
Qty: 0 | Refills: 0 | Status: DISCONTINUED | OUTPATIENT
Start: 2018-01-28 | End: 2018-01-30

## 2018-01-28 RX ORDER — INSULIN LISPRO 100/ML
VIAL (ML) SUBCUTANEOUS AT BEDTIME
Qty: 0 | Refills: 0 | Status: DISCONTINUED | OUTPATIENT
Start: 2018-01-28 | End: 2018-01-30

## 2018-01-28 RX ADMIN — FINASTERIDE 5 MILLIGRAM(S): 5 TABLET, FILM COATED ORAL at 12:25

## 2018-01-28 RX ADMIN — TAMSULOSIN HYDROCHLORIDE 0.8 MILLIGRAM(S): 0.4 CAPSULE ORAL at 21:51

## 2018-01-28 RX ADMIN — Medication 1: at 17:18

## 2018-01-28 RX ADMIN — Medication 2 UNIT(S): at 12:56

## 2018-01-28 RX ADMIN — Medication 650 MILLIGRAM(S): at 08:24

## 2018-01-28 RX ADMIN — ATORVASTATIN CALCIUM 10 MILLIGRAM(S): 80 TABLET, FILM COATED ORAL at 21:42

## 2018-01-28 RX ADMIN — HEPARIN SODIUM 5000 UNIT(S): 5000 INJECTION INTRAVENOUS; SUBCUTANEOUS at 21:42

## 2018-01-28 RX ADMIN — AMLODIPINE BESYLATE 10 MILLIGRAM(S): 2.5 TABLET ORAL at 12:23

## 2018-01-28 RX ADMIN — HEPARIN SODIUM 5000 UNIT(S): 5000 INJECTION INTRAVENOUS; SUBCUTANEOUS at 05:59

## 2018-01-28 RX ADMIN — Medication 125 MICROGRAM(S): at 05:58

## 2018-01-28 RX ADMIN — Medication 81 MILLIGRAM(S): at 12:24

## 2018-01-28 RX ADMIN — Medication 30 MILLIGRAM(S): at 17:19

## 2018-01-28 RX ADMIN — Medication 200 MILLIGRAM(S): at 21:43

## 2018-01-28 RX ADMIN — Medication 30 MILLIGRAM(S): at 05:59

## 2018-01-28 RX ADMIN — Medication 5 MILLIGRAM(S): at 05:58

## 2018-01-28 RX ADMIN — HEPARIN SODIUM 5000 UNIT(S): 5000 INJECTION INTRAVENOUS; SUBCUTANEOUS at 12:27

## 2018-01-28 NOTE — CONSULT NOTE ADULT - SUBJECTIVE AND OBJECTIVE BOX
Requesting Physician : Dr. Mejia    Reason for Consultation: CAD, known office patient     HISTORY OF PRESENT ILLNESS:  83 yo M wit history of CAD s/p MI in 1982, s/p POBA in 1995, HTN, HLD BPH who is being seen for management of CAD.  The patient was admitted with cough, fevers, body aches for 4-5 days.  He was evaluated in the ER and diagnosed with influenza.  Cardiology consulted to evaluate for management of CAD.  The patient has no chest pain or exertional dyspnea.  He denies orthopnea, le edema, or any other heart failure symptoms  He reports improvement in symptoms since admission.  The patient's last ischemic eval was a NST in my office in April of 2017 showing an old apical infarct with no kiera-infarct ischemia.  Upon evaluation, the patient had no chest pain.           PAST MEDICAL & SURGICAL HISTORY:  CAD (coronary artery disease)  Hypothyroid  BPH (Benign Prostatic Hypertrophy)  MI (Myocardial Infarction)  HTN (Hypertension)  High Cholesterol  S/P total knee replacement: Bilateral, right knee 7/2011 and left knee 10/2011  History of coronary angioplasty: with balloon angioplasty, no stent in 1993 and 1995          MEDICATIONS:  MEDICATIONS  (STANDING):  amLODIPine   Tablet 10 milliGRAM(s) Oral daily  aspirin enteric coated 81 milliGRAM(s) Oral daily  atorvastatin 10 milliGRAM(s) Oral at bedtime  dextrose 5%. 1000 milliLiter(s) (50 mL/Hr) IV Continuous <Continuous>  dextrose 5%. 1000 milliLiter(s) (50 mL/Hr) IV Continuous <Continuous>  dextrose 50% Injectable 12.5 Gram(s) IV Push once  dextrose 50% Injectable 25 Gram(s) IV Push once  dextrose 50% Injectable 25 Gram(s) IV Push once  dextrose 50% Injectable 12.5 Gram(s) IV Push once  dextrose 50% Injectable 25 Gram(s) IV Push once  dextrose 50% Injectable 25 Gram(s) IV Push once  finasteride 5 milliGRAM(s) Oral daily  heparin  Injectable 5000 Unit(s) SubCutaneous every 8 hours  insulin lispro (HumaLOG) corrective regimen sliding scale   SubCutaneous three times a day before meals  insulin lispro (HumaLOG) corrective regimen sliding scale   SubCutaneous at bedtime  levothyroxine 125 MICROGram(s) Oral daily  oseltamivir 30 milliGRAM(s) Oral every 12 hours  predniSONE   Tablet 5 milliGRAM(s) Oral daily  tamsulosin 0.8 milliGRAM(s) Oral at bedtime      Allergies    No Known Allergies    Intolerances        FAMILY HISTORY:  No pertinent family history in first degree relatives    Non-contributary for premature coronary disease or sudden cardiac death    SOCIAL HISTORY:    [x ] Non-smoker  [ ] Smoker  [ ] Alcohol      REVIEW OF SYSTEMS:  [ ]chest pain  [ x ]shortness of breath  [  ]palpitations  [  ]syncope  [ ]near syncope [ ]upper extremity weakness   [ ] lower extremity weakness  [  ]diplopia  [  ]altered mental status   [ x ]fevers  [ ]chills [ ]nausea  [ ]vomitting  [  ]dysphagia    [ ]abdominal pain  [ ]melena  [ ]BRBPR    [  ]epistaxis  [  ]rash    [ ]lower extremity edema        [x ] All others negative	  [ ] Unable to obtain    PHYSICAL EXAM:  T(C): 37.2 (01-28-18 @ 05:00), Max: 37.4 (01-27-18 @ 16:16)  HR: 66 (01-28-18 @ 05:00) (62 - 74)  BP: 133/67 (01-28-18 @ 05:00) (130/77 - 149/77)  RR: 16 (01-28-18 @ 05:00) (16 - 18)  SpO2: 94% (01-28-18 @ 05:00) (93% - 97%)  Wt(kg): --  I&O's Summary    27 Jan 2018 07:01  -  28 Jan 2018 07:00  --------------------------------------------------------  IN: 920 mL / OUT: 250 mL / NET: 670 mL    28 Jan 2018 07:01  -  28 Jan 2018 13:06  --------------------------------------------------------  IN: 250 mL / OUT: 400 mL / NET: -150 mL          HEENT:   Normal oral mucosa, PERRL, EOMI	  Lymphatic: No lymphadenopathy , no edema  Cardiovascular: Normal S1 S2, No JVD, No murmurs ,   Respiratory: Lungs clear to auscultation, normal effort 	  Gastrointestinal:  Soft, Non-tender, + BS	  Skin: No rashes, No ecchymoses, No cyanosis, warm to touch  Musculoskeletal: Normal range of motion, normal strength  Psychiatry:  Mood & affect appropriate      TELEMETRY: 	    ECG:  	  RADIOLOGY:  OTHER:     DIAGNOSTIC TESTING:  [ ] Echocardiogram:  [ ]  Catheterization:  [ ] Stress Test:    	  	  LABS:	 	    CARDIAC MARKERS:  CARDIAC MARKERS ( 26 Jan 2018 22:32 )  x     / <0.01 ng/mL / 193 U/L / x     / 3.0 ng/mL                              12.9   5.57  )-----------( 172      ( 28 Jan 2018 08:48 )             38.7     01-28    138  |  102  |  18  ----------------------------<  128<H>  4.3   |  22  |  1.09    Ca    8.3<L>      28 Jan 2018 08:58  Phos  2.8     01-27  Mg     2.0     01-27    TPro  6.9  /  Alb  3.9  /  TBili  0.4  /  DBili  x   /  AST  24  /  ALT  12  /  AlkPhos  57  01-26    proBNP:   Lipid Profile:   HgA1c:   TSH:     ASSESSMENT/PLAN: 	84yMale with CAD s/p POBA in 1995, HTN, HLD admitted with influenza.   -The patient has no anginal symptoms or clinical heart failure  -recommend asa for history of POBA  -no further cardiac workup needed at this time  -continue with medical management of cad  -treatment of influenza per primary team    Mayra Benitez MD  [facility]  [address] [place] [address], [id number]  [place] [address]  office: [phone]  pager: [phone]

## 2018-01-28 NOTE — PROGRESS NOTE ADULT - ATTENDING COMMENTS
Susana Valladares MD  Division of Hospital Medicine  Pager: [phone]  Office: [phone]
Susana Valladares MD  Division of Hospital Medicine  Pager: [phone]  Office: [phone]

## 2018-01-29 LAB
CK SERPL-CCNC: 140 U/L — SIGNIFICANT CHANGE UP (ref 30–200)
GLUCOSE BLDC GLUCOMTR-MCNC: 119 MG/DL — HIGH (ref 70–99)
GLUCOSE BLDC GLUCOMTR-MCNC: 159 MG/DL — HIGH (ref 70–99)
GLUCOSE BLDC GLUCOMTR-MCNC: 179 MG/DL — HIGH (ref 70–99)
GLUCOSE BLDC GLUCOMTR-MCNC: 227 MG/DL — HIGH (ref 70–99)
HCT VFR BLD CALC: 38.2 % — LOW (ref 39–50)
HGB BLD-MCNC: 13.2 G/DL — SIGNIFICANT CHANGE UP (ref 13–17)
MCHC RBC-ENTMCNC: 32.4 PG — SIGNIFICANT CHANGE UP (ref 27–34)
MCHC RBC-ENTMCNC: 34.5 GM/DL — SIGNIFICANT CHANGE UP (ref 32–36)
MCV RBC AUTO: 94.1 FL — SIGNIFICANT CHANGE UP (ref 80–100)
PLATELET # BLD AUTO: 167 K/UL — SIGNIFICANT CHANGE UP (ref 150–400)
RBC # BLD: 4.06 M/UL — LOW (ref 4.2–5.8)
RBC # FLD: 12.2 % — SIGNIFICANT CHANGE UP (ref 10.3–14.5)
TROPONIN T SERPL-MCNC: <0.01 NG/ML — SIGNIFICANT CHANGE UP
TSH SERPL-MCNC: 0.32 UIU/ML — SIGNIFICANT CHANGE UP (ref 0.27–4.2)
WBC # BLD: 5.2 K/UL — SIGNIFICANT CHANGE UP (ref 3.8–10.5)
WBC # FLD AUTO: 5.2 K/UL — SIGNIFICANT CHANGE UP (ref 3.8–10.5)

## 2018-01-29 PROCEDURE — 99233 SBSQ HOSP IP/OBS HIGH 50: CPT

## 2018-01-29 PROCEDURE — 99232 SBSQ HOSP IP/OBS MODERATE 35: CPT

## 2018-01-29 RX ORDER — IPRATROPIUM/ALBUTEROL SULFATE 18-103MCG
3 AEROSOL WITH ADAPTER (GRAM) INHALATION EVERY 6 HOURS
Qty: 0 | Refills: 0 | Status: DISCONTINUED | OUTPATIENT
Start: 2018-01-29 | End: 2018-01-30

## 2018-01-29 RX ORDER — ACETAMINOPHEN 500 MG
650 TABLET ORAL ONCE
Qty: 0 | Refills: 0 | Status: COMPLETED | OUTPATIENT
Start: 2018-01-29 | End: 2018-01-29

## 2018-01-29 RX ADMIN — Medication 30 MILLIGRAM(S): at 05:33

## 2018-01-29 RX ADMIN — Medication 3 MILLILITER(S): at 13:21

## 2018-01-29 RX ADMIN — HEPARIN SODIUM 5000 UNIT(S): 5000 INJECTION INTRAVENOUS; SUBCUTANEOUS at 05:33

## 2018-01-29 RX ADMIN — ATORVASTATIN CALCIUM 10 MILLIGRAM(S): 80 TABLET, FILM COATED ORAL at 21:58

## 2018-01-29 RX ADMIN — TAMSULOSIN HYDROCHLORIDE 0.8 MILLIGRAM(S): 0.4 CAPSULE ORAL at 21:58

## 2018-01-29 RX ADMIN — Medication 200 MILLIGRAM(S): at 19:46

## 2018-01-29 RX ADMIN — FINASTERIDE 5 MILLIGRAM(S): 5 TABLET, FILM COATED ORAL at 13:22

## 2018-01-29 RX ADMIN — Medication 1: at 08:26

## 2018-01-29 RX ADMIN — Medication 2: at 13:23

## 2018-01-29 RX ADMIN — HEPARIN SODIUM 5000 UNIT(S): 5000 INJECTION INTRAVENOUS; SUBCUTANEOUS at 21:58

## 2018-01-29 RX ADMIN — Medication 650 MILLIGRAM(S): at 20:45

## 2018-01-29 RX ADMIN — Medication 81 MILLIGRAM(S): at 13:21

## 2018-01-29 RX ADMIN — Medication 125 MICROGRAM(S): at 05:33

## 2018-01-29 RX ADMIN — Medication 3 MILLILITER(S): at 18:19

## 2018-01-29 RX ADMIN — HEPARIN SODIUM 5000 UNIT(S): 5000 INJECTION INTRAVENOUS; SUBCUTANEOUS at 13:23

## 2018-01-29 RX ADMIN — AMLODIPINE BESYLATE 10 MILLIGRAM(S): 2.5 TABLET ORAL at 05:33

## 2018-01-29 RX ADMIN — Medication 3 MILLILITER(S): at 22:02

## 2018-01-29 RX ADMIN — Medication 650 MILLIGRAM(S): at 19:47

## 2018-01-29 RX ADMIN — Medication 5 MILLIGRAM(S): at 05:33

## 2018-01-29 RX ADMIN — Medication 30 MILLIGRAM(S): at 17:44

## 2018-01-30 ENCOUNTER — TRANSCRIPTION ENCOUNTER (OUTPATIENT)
Age: 83
End: 2018-01-30

## 2018-01-30 VITALS
DIASTOLIC BLOOD PRESSURE: 80 MMHG | SYSTOLIC BLOOD PRESSURE: 144 MMHG | OXYGEN SATURATION: 98 % | HEART RATE: 75 BPM | RESPIRATION RATE: 17 BRPM

## 2018-01-30 LAB
CK MB BLD-MCNC: 1.1 % — SIGNIFICANT CHANGE UP (ref 0–3.5)
CK MB CFR SERPL CALC: 1.6 NG/ML — SIGNIFICANT CHANGE UP (ref 0–10)
GLUCOSE BLDC GLUCOMTR-MCNC: 124 MG/DL — HIGH (ref 70–99)
GLUCOSE BLDC GLUCOMTR-MCNC: 162 MG/DL — HIGH (ref 70–99)
GLUCOSE BLDC GLUCOMTR-MCNC: 248 MG/DL — HIGH (ref 70–99)

## 2018-01-30 PROCEDURE — 82550 ASSAY OF CK (CPK): CPT

## 2018-01-30 PROCEDURE — 71046 X-RAY EXAM CHEST 2 VIEWS: CPT

## 2018-01-30 PROCEDURE — 80053 COMPREHEN METABOLIC PANEL: CPT

## 2018-01-30 PROCEDURE — 82553 CREATINE MB FRACTION: CPT

## 2018-01-30 PROCEDURE — 80048 BASIC METABOLIC PNL TOTAL CA: CPT

## 2018-01-30 PROCEDURE — 82533 TOTAL CORTISOL: CPT

## 2018-01-30 PROCEDURE — 87633 RESP VIRUS 12-25 TARGETS: CPT

## 2018-01-30 PROCEDURE — 84443 ASSAY THYROID STIM HORMONE: CPT

## 2018-01-30 PROCEDURE — 82375 ASSAY CARBOXYHB QUANT: CPT

## 2018-01-30 PROCEDURE — 82962 GLUCOSE BLOOD TEST: CPT

## 2018-01-30 PROCEDURE — 87581 M.PNEUMON DNA AMP PROBE: CPT

## 2018-01-30 PROCEDURE — 85027 COMPLETE CBC AUTOMATED: CPT

## 2018-01-30 PROCEDURE — 97161 PT EVAL LOW COMPLEX 20 MIN: CPT

## 2018-01-30 PROCEDURE — 83036 HEMOGLOBIN GLYCOSYLATED A1C: CPT

## 2018-01-30 PROCEDURE — 84132 ASSAY OF SERUM POTASSIUM: CPT

## 2018-01-30 PROCEDURE — 99239 HOSP IP/OBS DSCHRG MGMT >30: CPT

## 2018-01-30 PROCEDURE — 94640 AIRWAY INHALATION TREATMENT: CPT

## 2018-01-30 PROCEDURE — 81001 URINALYSIS AUTO W/SCOPE: CPT

## 2018-01-30 PROCEDURE — 99232 SBSQ HOSP IP/OBS MODERATE 35: CPT

## 2018-01-30 PROCEDURE — 84484 ASSAY OF TROPONIN QUANT: CPT

## 2018-01-30 PROCEDURE — 82330 ASSAY OF CALCIUM: CPT

## 2018-01-30 PROCEDURE — 93005 ELECTROCARDIOGRAM TRACING: CPT

## 2018-01-30 PROCEDURE — 84100 ASSAY OF PHOSPHORUS: CPT

## 2018-01-30 PROCEDURE — 84295 ASSAY OF SERUM SODIUM: CPT

## 2018-01-30 PROCEDURE — 96374 THER/PROPH/DIAG INJ IV PUSH: CPT

## 2018-01-30 PROCEDURE — 99285 EMERGENCY DEPT VISIT HI MDM: CPT | Mod: 25

## 2018-01-30 PROCEDURE — 83605 ASSAY OF LACTIC ACID: CPT

## 2018-01-30 PROCEDURE — 87798 DETECT AGENT NOS DNA AMP: CPT

## 2018-01-30 PROCEDURE — 83735 ASSAY OF MAGNESIUM: CPT

## 2018-01-30 PROCEDURE — 82435 ASSAY OF BLOOD CHLORIDE: CPT

## 2018-01-30 PROCEDURE — 85014 HEMATOCRIT: CPT

## 2018-01-30 PROCEDURE — 82803 BLOOD GASES ANY COMBINATION: CPT

## 2018-01-30 PROCEDURE — 82947 ASSAY GLUCOSE BLOOD QUANT: CPT

## 2018-01-30 PROCEDURE — 87486 CHLMYD PNEUM DNA AMP PROBE: CPT

## 2018-01-30 RX ORDER — ALBUTEROL 90 UG/1
2 AEROSOL, METERED ORAL EVERY 6 HOURS
Qty: 0 | Refills: 0 | Status: DISCONTINUED | OUTPATIENT
Start: 2018-01-30 | End: 2018-01-30

## 2018-01-30 RX ORDER — LEVOTHYROXINE SODIUM 125 MCG
1 TABLET ORAL
Qty: 0 | Refills: 0 | COMMUNITY

## 2018-01-30 RX ORDER — ALBUTEROL 90 UG/1
2 AEROSOL, METERED ORAL
Qty: 1 | Refills: 0 | OUTPATIENT
Start: 2018-01-30 | End: 2018-02-28

## 2018-01-30 RX ORDER — LEVOTHYROXINE SODIUM 125 MCG
1 TABLET ORAL
Qty: 30 | Refills: 0 | OUTPATIENT
Start: 2018-01-30 | End: 2018-02-28

## 2018-01-30 RX ORDER — ACETAMINOPHEN 500 MG
2 TABLET ORAL
Qty: 0 | Refills: 0 | COMMUNITY
Start: 2018-01-30

## 2018-01-30 RX ORDER — GLIMEPIRIDE 1 MG
1 TABLET ORAL
Qty: 0 | Refills: 0 | COMMUNITY

## 2018-01-30 RX ORDER — LEVOTHYROXINE SODIUM 125 MCG
112 TABLET ORAL DAILY
Qty: 0 | Refills: 0 | Status: DISCONTINUED | OUTPATIENT
Start: 2018-01-30 | End: 2018-01-30

## 2018-01-30 RX ADMIN — Medication 1: at 08:22

## 2018-01-30 RX ADMIN — Medication 125 MICROGRAM(S): at 06:41

## 2018-01-30 RX ADMIN — Medication 5 MILLIGRAM(S): at 06:41

## 2018-01-30 RX ADMIN — Medication 2: at 12:30

## 2018-01-30 RX ADMIN — Medication 81 MILLIGRAM(S): at 12:30

## 2018-01-30 RX ADMIN — FINASTERIDE 5 MILLIGRAM(S): 5 TABLET, FILM COATED ORAL at 12:30

## 2018-01-30 RX ADMIN — Medication 3 MILLILITER(S): at 06:42

## 2018-01-30 RX ADMIN — Medication 3 MILLILITER(S): at 17:08

## 2018-01-30 RX ADMIN — HEPARIN SODIUM 5000 UNIT(S): 5000 INJECTION INTRAVENOUS; SUBCUTANEOUS at 06:41

## 2018-01-30 RX ADMIN — HEPARIN SODIUM 5000 UNIT(S): 5000 INJECTION INTRAVENOUS; SUBCUTANEOUS at 14:31

## 2018-01-30 RX ADMIN — Medication 3 MILLILITER(S): at 12:31

## 2018-01-30 RX ADMIN — AMLODIPINE BESYLATE 10 MILLIGRAM(S): 2.5 TABLET ORAL at 06:41

## 2018-01-30 RX ADMIN — Medication 30 MILLIGRAM(S): at 06:41

## 2018-01-30 RX ADMIN — Medication 30 MILLIGRAM(S): at 17:08

## 2018-01-30 NOTE — DISCHARGE NOTE ADULT - PLAN OF CARE
Remain without fever Drinking electrolyte-replacing fluids, not sugary sodas or sports drinks, regularly will prevent dehydration (abnormal loss of body fluids). Acetaminophen (Tylenol, for example) will help to reduce fever and relieve headache. Good handwashing can prevent spread the virus.  Continue your medications as prescribed. HgA1C this admission.  Make sure you get your HgA1c checked every three months.  If you take oral diabetes medications, check your blood glucose two times a day.  If you take insulin, check your blood glucose before meals and at bedtime.  It's important not to skip any meals.  Keep a log of your blood glucose results and always take it with you to your doctor appointments.  Keep a list of your current medications including injectables and over the counter medications and bring this medication list with you to all your doctor appointments.  If you have not seen your ophthalmologist this year call for appointment.  Check your feet daily for redness, sores, or openings. Do not self treat. If no improvement in two days call your primary care physician for an appointment.  Low blood sugar (hypoglycemia) is a blood sugar below 70mg/dl. Check your blood sugar if you feel signs/symptoms of hypoglycemia. If your blood sugar is below 70 take 15 grams of carbohydrates (ex 4 oz of apple juice, 3-4 glucose tablets, or 4-6 oz of regular soda) wait 15 minutes and repeat blood sugar to make sure it comes up above 70.  If your blood sugar is above 70 and you are due for a meal, have a meal.  If you are not due for a meal have a snack.  This snack helps keeps your blood sugar at a safe range. Low salt diet  Activity as tolerated.  Take all medication as prescribed.  Follow up with your medical doctor for routine blood pressure monitoring at your next visit.  Notify your doctor if you have any of the following symptoms:   Dizziness, Lightheadedness, Blurry vision, Headache, Chest pain, Shortness of breath

## 2018-01-30 NOTE — PROGRESS NOTE ADULT - PROBLEM SELECTOR PLAN 6
- TSH low 0.32,  - per endocrine, decrease Levothyroxine to 112mcg daily (from 125mcg)
- c/w Levothyroxine 125 (home dose)  - Check TSH
- c/w Levothyroxine 125 (home dose)  - Check TSH
DVT ppx: HSQ

## 2018-01-30 NOTE — DISCHARGE NOTE ADULT - ADDITIONAL INSTRUCTIONS
Make appointments to follow up with your out patient physicians.  Bring all discharge paperwork including discharge medication list to your follow up appointments.  Follow up with you doctor in 1 week.

## 2018-01-30 NOTE — PROGRESS NOTE ADULT - PROBLEM SELECTOR PLAN 1
FS's OK, w/o further hypoglycemia. Off D10 and D5.  Would cont. low dose SS only for now TIDAC/qhs.  Cont. to monitor FS's TIDAC/qhs
Patient with poor PO intake in setting of Flu and has taken Metformin 500 x1 and Glimeperide 2 mg BID prior to arrival to ED  - Pt treated with D10 gtt and D5 gtt yesterday, now FS stable off of IV fluids (d/roger by endocrine)  - Endo f/u  - Monitor FS  - hypoglycemic protocol
FS's improved, w/o further hypoglycemia. Now off D5.  Would cont. low dose SS only for now TIDAC/qhs.  Cont. to monitor FS's TIDAC/qhs
Improving  Patient with poor PO intake in setting of Flu and has taken Metformin 500 x1 and Glimeperide 2 mg BID prior to arrival to ED  - Pt treated with D10 gtt and D5 gtt, now FS stable off of IV fluids (d/roger by endocrine)  - per endocrine continue to monitor FS and c/w KENYA
Patient with poor PO intake in setting of Flu and has taken Metformin 500 x1 and Glimeperide 2 mg BID prior to arrival to ED  FS: 56 -> D50 -> 107 -> 65 -> D10 + oral glucose -> 74 -> 84 -> 79  Given his kidney function, sulfonylurea may linger in the system for longer than expected, causing persistent hypoglycemia.   - consult endocrinology  - care discussed with Dr. Coronel (endocrinology); will evaluate the patient and he may require octreotide  - continue to monitor FS closely q2hrs  - hypoglycemic protocol
Resolved   poor PO intake in setting of Flu and has taken Metformin 500 x1 and Glimeperide 2 mg BID at home  - Pt treated with D10 gtt and D5 gtt, now FS stable off of IV fluids (d/roger by endocrine)  - FS have been stable, no hypoglycemia  -will discontinue Glimepiride on discharge  -c/w metformin 500mg bid  -appreciate endocrine recs

## 2018-01-30 NOTE — DISCHARGE NOTE ADULT - CARE PROVIDER_API CALL
Mukund Vernon), Cardiology  [address] [facility] [address]  [place] [address]  Phone: [phone]  Fax: [phone]    Ventura Jaimes), Internal Medicine; Pulmonary Disease  [facility]  [address] [place] [address]  Phone: [phone]  Fax: [phone]

## 2018-01-30 NOTE — DISCHARGE NOTE ADULT - MEDICATION SUMMARY - MEDICATIONS TO STOP TAKING
I will STOP taking the medications listed below when I get home from the hospital:    glimepiride 2 mg oral tablet  -- 1 tab(s) by mouth once a day

## 2018-01-30 NOTE — PROGRESS NOTE ADULT - PROBLEM SELECTOR PLAN 4
stable  C/W with aspirin and statin  EKG shows TWI in III, AVF, V1 along with q waves  troponin negative  per cardiology, no further inpatient wokrup
No current symptoms  C/W with aspirin and statin  EKG shows TWI in III, AVF, V1 along with q waves  Check repeat cardiac enzymes and TTE
cont. statin
No current symptoms  C/W with aspirin and statin
No current symptoms  C/W with aspirin and statin  EKG shows TWI in III, AVF, V1 along with q waves  Check repeat cardiac enzymes and TTE
cont. statin

## 2018-01-30 NOTE — PROGRESS NOTE ADULT - PROVIDER SPECIALTY LIST ADULT
Cardiology
Cardiology
Endocrinology
Hospitalist
Hospitalist
Endocrinology
Hospitalist
Internal Medicine

## 2018-01-30 NOTE — DISCHARGE NOTE ADULT - MEDICATION SUMMARY - MEDICATIONS TO TAKE
I will START or STAY ON the medications listed below when I get home from the hospital:    finasteride 5 mg oral tablet  -- 1 tab(s) by mouth once a day  -- Indication: For BPH (benign prostatic hyperplasia)    Sindy 5 mg oral delayed release tablet  -- 1 tab(s) by mouth once a day  -- Indication: For Acquired hypothyroidism    acetaminophen 325 mg oral tablet  -- 2 tab(s) by mouth every 6 hours, As needed, For Temp greater than 38 C (100.4 F)  -- Indication: For fever    Aspirin Enteric Coated 81 mg oral delayed release tablet  -- 1 tab(s) by mouth once a day  -- Indication: For CAD (coronary artery disease)    valsartan 320 mg oral tablet  -- 1 tab(s) by mouth once a day  -- Indication: For HTN (Hypertension)    tamsulosin 0.4 mg oral capsule  -- 2 cap(s) by mouth once a day (at bedtime)  -- Indication: For BPH (benign prostatic hyperplasia)    metFORMIN 500 mg oral tablet  -- orally once a day  -- Indication: For Diabetes    levocetirizine 5 mg oral tablet  -- 1 tab(s) by mouth once a day (in the evening), As Needed  -- Indication: For Prophylactic measure    atorvastatin 10 mg oral tablet  -- 1 tab(s) by mouth once a day  -- Indication: For CAD (coronary artery disease)    oseltamivir 30 mg oral capsule  -- 1 cap(s) by mouth every 12 hours  -- Indication: For Influenza    atenolol 25 mg oral tablet  -- 1 tab(s) by mouth once a day  -- Indication: For HTN (Hypertension)    albuterol 90 mcg/inh inhalation aerosol  -- 2 puff(s) inhaled every 6 hours, As needed, Shortness of Breath  -- Indication: For Influenza    amLODIPine 10 mg oral tablet  -- 1 tab(s) by mouth once a day  -- Indication: For HTN (Hypertension)    guaiFENesin 100 mg/5 mL oral liquid  -- 10 milliliter(s) by mouth every 6 hours, As needed, Cough  -- Indication: For Cough    famotidine 40 mg oral tablet  -- 1 tab(s) by mouth once a day (at bedtime), As Needed  -- Indication: For dyspepsia    fluticasone 50 mcg/inh nasal spray  -- 2 spray(s) into nose once a day, As Needed  -- Indication: For Prophylactic measure    azelastine 205.5 mcg/inh (0.15%) nasal spray  -- 1 spray(s) into nose 2 times a day, As Needed  -- Indication: For Prophylactic measure    levothyroxine 112 mcg (0.112 mg) oral tablet  -- 1 tab(s) by mouth once a day  -- Indication: For Acquired hypothyroidism

## 2018-01-30 NOTE — PHYSICAL THERAPY INITIAL EVALUATION ADULT - PRECAUTIONS/LIMITATIONS, REHAB EVAL
fall precautions/isolation precautions fall precautions/isolation precautions/droplet precautions - influenza A

## 2018-01-30 NOTE — PROGRESS NOTE ADULT - PROBLEM SELECTOR PROBLEM 3
HTN (Hypertension)
Essential hypertension
HTN (Hypertension)
Essential hypertension
HTN (Hypertension)
HTN (Hypertension)

## 2018-01-30 NOTE — PROGRESS NOTE ADULT - PROBLEM SELECTOR PROBLEM 4
CAD (coronary artery disease)
CAD (coronary artery disease)
Hyperlipidemia, unspecified hyperlipidemia type
CAD (coronary artery disease)
CAD (coronary artery disease)
Hyperlipidemia, unspecified hyperlipidemia type

## 2018-01-30 NOTE — DISCHARGE NOTE ADULT - PATIENT PORTAL LINK FT
“You can access the [facility] Patient Portal, offered by [facility], by registering with the following website: [email or web address]”

## 2018-01-30 NOTE — PROGRESS NOTE ADULT - PROBLEM SELECTOR PROBLEM 1
Hypoglycemia
Hypoglycemia associated with type 2 diabetes mellitus
Hypoglycemia
Hypoglycemia
Hypoglycemia associated with type 2 diabetes mellitus
Hypoglycemia

## 2018-01-30 NOTE — PHYSICAL THERAPY INITIAL EVALUATION ADULT - PERTINENT HX OF CURRENT PROBLEM, REHAB EVAL
85 yo man with PM of CAD (s/p angioplasty no stent), HTN, HLD, BPH, on chronic prednisone presenting from home in setting of cough, fever, body aches and generalized fatigue in the past 4 days found with Influenza A and with hypoglycemia.

## 2018-01-30 NOTE — DISCHARGE NOTE ADULT - HOSPITAL COURSE
83 yo man with PM of CAD (s/p angioplasty no stent), HTN, HLD, BPH, on chronic prednisone presenting from home in setting of cough, fever, body aches and generalized fatigue in the past 4 days. Patient endorses symptoms of fevers (as high as 101) chills, sweats, nasal congestion sore throat and fairly nonproductive cough, myalgias the past 4 days. Patient states that his wife has been under the weather and presented to the ED as well earlier in the evening with patient and she was found with flu. Patient can not recall any other sick contacts besides going to his regular doctor's visits. Patient received flu shot in 10/2017. Patient also endorses a decreased appetite. Patient denies n/v/diarrhea. Took Metformin and Glimepiride BID prior to coming the ED  Dx:  Hypoglycemia (took po DM meds before coming to ED)         Influenza A - Tamiflu started, hypoglycemia  1/27 5 am FS of 56.Given 1 amp d50 at 0540. Repeat .  at 6am  FS was 65. Hypoglycemia protocol, patient given oral glucose.   repeat FS after oral glucose is 74. Patient was changed to d10.   --[facility] resident came to ED stating that they have to trial D10 for 1 hour.      As per Endo-> IVF changed to D5, pt improved.  D5 stopped.  D/C to home off of glypizide. 83 yo man with PM of CAD (s/p angioplasty no stent), HTN, HLD, BPH, on chronic prednisone presenting from home in setting of cough, fever, body aches and generalized fatigue in the past 4 days. Patient endorses symptoms of fevers (as high as 101) chills, sweats, nasal congestion sore throat and fairly nonproductive cough, myalgias the past 4 days. Patient states that his wife has been under the weather and presented to the ED as well earlier in the evening with patient and she was found with flu. Patient can not recall any other sick contacts besides going to his regular doctor's visits. Patient received flu shot in 10/2017. Patient also endorses a decreased appetite. Patient denies n/v/diarrhea. Took Metformin and Glimepiride BID prior to coming the ED    Hospital Course: Patient was admitted to [facility]U for hypoglycemia which was likely due to poor po intake in setting of Influenza and sulfonylurea usage. He was put on D10, eventually D5 and then downgraded to floors when  FS became stable. He was started on tamiflu for influenza A. Endocrine was consulted and recommended discontinuing his Glimperide on discharge. His TSH was low so his synthroid dose was increased. Patient symptoms resolved, was tolerating PO without n/v, saturating well on room air, remained afebrile, HD stable. Cards saw patient and did not want any inpatient workup. All other active medical issues addressed in hospitalization. Will continue with tamlflu x 5 days total and proair inhaler. PT rec no skilled PT needs. Patient will follow up with PCP and stop glimepiride on discharge    Discharge Diagnosis:  Hypoglycemia associated with type 2 diabetes mellitus  Influenza A  History of CAD  Hypothyroid  BPH  HTN  HLD

## 2018-01-30 NOTE — PHYSICAL THERAPY INITIAL EVALUATION ADULT - DISCHARGE DISPOSITION, PT EVAL
Home with no skilled PT needs, no AD recommendation. **Pt cleared for d/c home from PT perspective at this time./home/no skilled PT needs

## 2018-01-30 NOTE — PROGRESS NOTE ADULT - PROBLEM SELECTOR PLAN 3
BP borderline elevated, only on home amlodipine  restart home meds Atenolol/Valsartan on discharge  outpatient follow up with PCP for BP check next week
-140s  Home HTN meds had been held (Amlodipine/Atenolol/Valsartan)  Will resume Amlodipine today w hold parameters
Cont. current regimen
-140s  Home HTN meds had been held (Amlodipine/Atenolol/Valsartan)  Will resume Amlodipine today w hold parameters
Cont. current regimen
Monitor vitals  SBP in the 110s. concern for sepsis in setting of hypoglycemia, however BP stable, not tachycardic, no gross leukocytosis.  Will hold with AM HTN meds (Amlodipine/Atenolol/Valsartan) pending improved hemodynamic stability.

## 2018-01-30 NOTE — PHYSICAL THERAPY INITIAL EVALUATION ADULT - GENERAL OBSERVATIONS, REHAB EVAL
Pt received seated in chair, NAD. Pt's spouse Diamond also admitted to hospital and in same room as pt. Pt's daughter at bedside. Pt alert, agreeable to PT eval.

## 2018-01-30 NOTE — PHYSICAL THERAPY INITIAL EVALUATION ADULT - ADDITIONAL COMMENTS
Pt lives with spouse Diamond in private split-level home with multiple flights of ~6 stairs within, (+) HR, no stairs to enter.

## 2018-01-30 NOTE — PROGRESS NOTE ADULT - SUBJECTIVE AND OBJECTIVE BOX
Chief Complaint: DM2 w/ hypoglycemia in setting of Amaryl use and poor PO intake    S: Pt. feels overall better. States appetite is reduced, but he is eating well. No c/o N/V.    MEDICATIONS  (STANDING):  ALBUTerol/ipratropium for Nebulization 3 milliLiter(s) Nebulizer every 6 hours  amLODIPine   Tablet 10 milliGRAM(s) Oral daily  aspirin enteric coated 81 milliGRAM(s) Oral daily  atorvastatin 10 milliGRAM(s) Oral at bedtime  dextrose 5%. 1000 milliLiter(s) (50 mL/Hr) IV Continuous <Continuous>  dextrose 50% Injectable 12.5 Gram(s) IV Push once  dextrose 50% Injectable 25 Gram(s) IV Push once  dextrose 50% Injectable 25 Gram(s) IV Push once  dextrose 50% Injectable 12.5 Gram(s) IV Push once  dextrose 50% Injectable 25 Gram(s) IV Push once  dextrose 50% Injectable 25 Gram(s) IV Push once  finasteride 5 milliGRAM(s) Oral daily  heparin  Injectable 5000 Unit(s) SubCutaneous every 8 hours  insulin lispro (HumaLOG) corrective regimen sliding scale   SubCutaneous three times a day before meals  insulin lispro (HumaLOG) corrective regimen sliding scale   SubCutaneous at bedtime  levothyroxine 125 MICROGram(s) Oral daily  oseltamivir 30 milliGRAM(s) Oral every 12 hours  predniSONE   Tablet 5 milliGRAM(s) Oral daily  tamsulosin 0.8 milliGRAM(s) Oral at bedtime    MEDICATIONS  (PRN):  acetaminophen   Tablet 650 milliGRAM(s) Oral every 6 hours PRN For Temp greater than 38 C (100.4 F)  ALBUTerol/ipratropium for Nebulization 3 milliLiter(s) Nebulizer every 6 hours PRN Shortness of Breath and/or Wheezing  dextrose Gel 1 Dose(s) Oral once PRN Blood Glucose LESS THAN 70 milliGRAM(s)/deciliter  dextrose Gel 1 Dose(s) Oral once PRN Blood Glucose LESS THAN 70 milliGRAM(s)/deciliter  famotidine    Tablet 20 milliGRAM(s) Oral at bedtime PRN Reflux symptoms  glucagon  Injectable 1 milliGRAM(s) IntraMuscular once PRN Glucose LESS THAN 70 milligrams/deciliter  glucagon  Injectable 1 milliGRAM(s) IntraMuscular once PRN Glucose LESS THAN 70 milligrams/deciliter  guaiFENesin   Syrup  (Sugar-Free) 200 milliGRAM(s) Oral every 6 hours PRN Cough      Allergies    No Known Allergies    Intolerances      Review of Systems:  Constitutional: No fever  Eyes: No blurry vision  Neuro: No tremors  HEENT: No pain  Cardiovascular: No chest pain, palpitations  Respiratory: No SOB, no cough  GI: No nausea, vomiting, abdominal pain  : No dysuria  Skin: no rash  Psych: no depression  Endocrine: no polyuria, polydipsia  Hem/lymph: no swelling  Osteoporosis: no fractures      PHYSICAL EXAM:  VITALS: T(C): 36.7 (01-30-18 @ 04:41)  T(F): 98 (01-30-18 @ 04:41), Max: 98.2 (01-29-18 @ 20:08)  HR: 75 (01-30-18 @ 04:41) (70 - 75)  BP: 153/75 (01-30-18 @ 04:41) (132/75 - 153/75)  RR:  (16 - 17)  SpO2:  (93% - 97%)  Wt(kg): --  GENERAL: NAD, well-groomed, well-developed  EYES: No proptosis, anicteric  HEENT:  Atraumatic, Normocephalic, moist mucous membranes  RESPIRATORY: Clear to auscultation bilaterally  CARDIOVASCULAR: Regular rate and rhythm  SKIN: Dry, intact, No rashes or lesions  MUSCULOSKELETAL: Full range of motion  PSYCH: Alert and oriented x 3, normal affect, normal mood    POCT Blood Glucose.: 162 mg/dL (01-30-18 @ 07:17)  POCT Blood Glucose.: 179 mg/dL (01-29-18 @ 21:12)  POCT Blood Glucose.: 119 mg/dL (01-29-18 @ 15:58)  POCT Blood Glucose.: 227 mg/dL (01-29-18 @ 11:30)  POCT Blood Glucose.: 159 mg/dL (01-29-18 @ 07:41)  POCT Blood Glucose.: 188 mg/dL (01-28-18 @ 20:58)  POCT Blood Glucose.: 175 mg/dL (01-28-18 @ 16:12)  POCT Blood Glucose.: 244 mg/dL (01-28-18 @ 11:44)  POCT Blood Glucose.: 140 mg/dL (01-28-18 @ 07:40)  POCT Blood Glucose.: 136 mg/dL (01-28-18 @ 00:00)  POCT Blood Glucose.: 146 mg/dL (01-27-18 @ 22:06)  POCT Blood Glucose.: 126 mg/dL (01-27-18 @ 19:57)  POCT Blood Glucose.: 147 mg/dL (01-27-18 @ 18:05)  POCT Blood Glucose.: 177 mg/dL (01-27-18 @ 12:45)  POCT Blood Glucose.: 80 mg/dL (01-27-18 @ 10:51)      01-28    138  |  102  |  18  ----------------------------<  128<H>  4.3   |  22  |  1.09    EGFR if : 72  EGFR if non : 62    Ca    8.3<L>      01-28            Thyroid Function Tests:  01-29 @ 13:36 TSH 0.32 FreeT4 -- T3 -- Anti TPO -- Anti Thyroglobulin Ab -- TSI --      Hemoglobin A1C, Whole Blood: 6.4 % <H> [4.0 - 5.6] (01-27-18 @ 09:02)
Patient is a 84y old  Male who presents with a chief complaint of fever, cough (2018 04:51)      SUBJECTIVE / OVERNIGHT EVENTS: No acute overnight events. Pt reports feeling better today. Still some cough and back discomfort. Denies headache, dizziness, fever, chills, chest pain, SOB, abd pain, n/v/d, dysuria.    MEDICATIONS  (STANDING):  aspirin enteric coated 81 milliGRAM(s) Oral daily  atorvastatin 10 milliGRAM(s) Oral at bedtime  dextrose 5%. 1000 milliLiter(s) (50 mL/Hr) IV Continuous <Continuous>  dextrose 50% Injectable 12.5 Gram(s) IV Push once  dextrose 50% Injectable 25 Gram(s) IV Push once  dextrose 50% Injectable 25 Gram(s) IV Push once  finasteride 5 milliGRAM(s) Oral daily  heparin  Injectable 5000 Unit(s) SubCutaneous every 8 hours  levothyroxine 125 MICROGram(s) Oral daily  oseltamivir 30 milliGRAM(s) Oral every 12 hours  predniSONE   Tablet 5 milliGRAM(s) Oral daily  tamsulosin 0.8 milliGRAM(s) Oral at bedtime    MEDICATIONS  (PRN):  acetaminophen   Tablet 650 milliGRAM(s) Oral every 6 hours PRN For Temp greater than 38 C (100.4 F)  ALBUTerol/ipratropium for Nebulization 3 milliLiter(s) Nebulizer every 6 hours PRN Shortness of Breath and/or Wheezing  dextrose Gel 1 Dose(s) Oral once PRN Blood Glucose LESS THAN 70 milliGRAM(s)/deciliter  famotidine    Tablet 20 milliGRAM(s) Oral at bedtime PRN Reflux symptoms  glucagon  Injectable 1 milliGRAM(s) IntraMuscular once PRN Glucose LESS THAN 70 milligrams/deciliter  guaiFENesin   Syrup  (Sugar-Free) 200 milliGRAM(s) Oral every 6 hours PRN Cough      Vital Signs Last 24 Hrs  T(C): 37.2 (2018 05:00), Max: 37.4 (2018 16:16)  T(F): 99 (2018 05:00), Max: 99.3 (2018 16:16)  HR: 66 (2018 05:00) (62 - 74)  BP: 133/67 (2018 05:00) (130/77 - 149/77)  BP(mean): --  RR: 16 (2018 05:00) (16 - 18)  SpO2: 94% (2018 05:00) (93% - 97%)  CAPILLARY BLOOD GLUCOSE      POCT Blood Glucose.: 140 mg/dL (2018 07:40)  POCT Blood Glucose.: 136 mg/dL (2018 00:00)  POCT Blood Glucose.: 146 mg/dL (2018 22:06)  POCT Blood Glucose.: 126 mg/dL (2018 19:57)  POCT Blood Glucose.: 147 mg/dL (2018 18:05)  POCT Blood Glucose.: 177 mg/dL (2018 12:45)    I&O's Summary    2018 07:  -  2018 07:00  --------------------------------------------------------  IN: 920 mL / OUT: 250 mL / NET: 670 mL    2018 07:01  -  2018 11:38  --------------------------------------------------------  IN: 250 mL / OUT: 400 mL / NET: -150 mL        PHYSICAL EXAM:  GENERAL: NAD, well-developed  HEAD:  Atraumatic, Normocephalic  EYES: EOMI, PERRLA, conjunctiva and sclera clear  NECK: Supple, No JVD  CHEST/LUNG: Clear to auscultation bilaterally; No wheeze  HEART: Regular rate and rhythm; No murmurs, rubs, or gallops  ABDOMEN: Soft, Nontender, Nondistended; Bowel sounds present  EXTREMITIES:  2+ Peripheral Pulses, No clubbing, cyanosis, or edema  PSYCH: AAOx3  NEUROLOGY: non-focal  SKIN: No rashes or lesions    LABS:                        12.9   5.57  )-----------( 172      ( 2018 08:48 )             38.7         138  |  102  |  18  ----------------------------<  128<H>  4.3   |  22  |  1.09    Ca    8.3<L>      2018 08:58  Phos  2.8       Mg     2.0         TPro  6.9  /  Alb  3.9  /  TBili  0.4  /  DBili  x   /  AST  24  /  ALT  12  /  AlkPhos  57        CARDIAC MARKERS ( 2018 22:32 )  x     / <0.01 ng/mL / 193 U/L / x     / 3.0 ng/mL      Urinalysis Basic - ( 2018 02:09 )    Color: PL Yellow / Appearance: Clear / S.011 / pH: x  Gluc: x / Ketone: Negative  / Bili: Negative / Urobili: Negative   Blood: x / Protein: Trace / Nitrite: Negative   Leuk Esterase: Moderate / RBC: 3-5 /HPF / WBC 11-25 /HPF   Sq Epi: x / Non Sq Epi: OCC /HPF / Bacteria: x        RADIOLOGY & ADDITIONAL TESTS:    Imaging Personally Reviewed:    Consultant(s) Notes Reviewed:  Endocrine    Care Discussed with Consultants/Other Providers:
Chief Complaint: DM2 w/ hypoglycemia (from amaryl)    S: Pt. states he is feeling overall better. Reports good appetite, w/o N/V. No further episodes of hypoglycemia in >24h.    MEDICATIONS  (STANDING):  amLODIPine   Tablet 10 milliGRAM(s) Oral daily  aspirin enteric coated 81 milliGRAM(s) Oral daily  atorvastatin 10 milliGRAM(s) Oral at bedtime  dextrose 5%. 1000 milliLiter(s) (50 mL/Hr) IV Continuous <Continuous>  dextrose 50% Injectable 12.5 Gram(s) IV Push once  dextrose 50% Injectable 25 Gram(s) IV Push once  dextrose 50% Injectable 25 Gram(s) IV Push once  dextrose 50% Injectable 12.5 Gram(s) IV Push once  dextrose 50% Injectable 25 Gram(s) IV Push once  dextrose 50% Injectable 25 Gram(s) IV Push once  finasteride 5 milliGRAM(s) Oral daily  heparin  Injectable 5000 Unit(s) SubCutaneous every 8 hours  insulin lispro (HumaLOG) corrective regimen sliding scale   SubCutaneous three times a day before meals  insulin lispro (HumaLOG) corrective regimen sliding scale   SubCutaneous at bedtime  levothyroxine 125 MICROGram(s) Oral daily  oseltamivir 30 milliGRAM(s) Oral every 12 hours  predniSONE   Tablet 5 milliGRAM(s) Oral daily  tamsulosin 0.8 milliGRAM(s) Oral at bedtime    MEDICATIONS  (PRN):  acetaminophen   Tablet 650 milliGRAM(s) Oral every 6 hours PRN For Temp greater than 38 C (100.4 F)  ALBUTerol/ipratropium for Nebulization 3 milliLiter(s) Nebulizer every 6 hours PRN Shortness of Breath and/or Wheezing  dextrose Gel 1 Dose(s) Oral once PRN Blood Glucose LESS THAN 70 milliGRAM(s)/deciliter  dextrose Gel 1 Dose(s) Oral once PRN Blood Glucose LESS THAN 70 milliGRAM(s)/deciliter  famotidine    Tablet 20 milliGRAM(s) Oral at bedtime PRN Reflux symptoms  glucagon  Injectable 1 milliGRAM(s) IntraMuscular once PRN Glucose LESS THAN 70 milligrams/deciliter  glucagon  Injectable 1 milliGRAM(s) IntraMuscular once PRN Glucose LESS THAN 70 milligrams/deciliter  guaiFENesin   Syrup  (Sugar-Free) 200 milliGRAM(s) Oral every 6 hours PRN Cough      Allergies    No Known Allergies    Intolerances      Review of Systems:  Constitutional: No fever  Eyes: No blurry vision  Neuro: No tremors  HEENT: No pain  Cardiovascular: No chest pain, palpitations  Respiratory: No SOB, no cough  GI: No nausea, vomiting, abdominal pain  : No dysuria  Skin: no rash  Psych: no depression  Endocrine: no polyuria, polydipsia  Hem/lymph: no swelling  Osteoporosis: no fractures    UNABLE TO OBTAIN    PHYSICAL EXAM:  VITALS: T(C): 36.6 (01-29-18 @ 04:52)  T(F): 97.9 (01-29-18 @ 04:52), Max: 98.7 (01-28-18 @ 13:25)  HR: 63 (01-29-18 @ 04:52) (63 - 70)  BP: 125/70 (01-29-18 @ 04:52) (113/61 - 129/73)  RR:  (16 - 19)  SpO2:  (93% - 96%)  Wt(kg): --  GENERAL: NAD, well-groomed, well-developed  EYES: No proptosis, anicteric  HEENT:  Atraumatic, Normocephalic, moist mucous membranes  RESPIRATORY: Clear to auscultation bilaterally  CARDIOVASCULAR: Regular rate and rhythm  SKIN: Dry, intact, No rashes or lesions  MUSCULOSKELETAL: Full range of motion  PSYCH: Alert and oriented x 3, normal affect, normal mood      POCT Blood Glucose.: 159 mg/dL (01-29-18 @ 07:41)  POCT Blood Glucose.: 188 mg/dL (01-28-18 @ 20:58)  POCT Blood Glucose.: 175 mg/dL (01-28-18 @ 16:12)  POCT Blood Glucose.: 244 mg/dL (01-28-18 @ 11:44)  POCT Blood Glucose.: 140 mg/dL (01-28-18 @ 07:40)  POCT Blood Glucose.: 136 mg/dL (01-28-18 @ 00:00)  POCT Blood Glucose.: 146 mg/dL (01-27-18 @ 22:06)  POCT Blood Glucose.: 126 mg/dL (01-27-18 @ 19:57)  POCT Blood Glucose.: 147 mg/dL (01-27-18 @ 18:05)  POCT Blood Glucose.: 177 mg/dL (01-27-18 @ 12:45)  POCT Blood Glucose.: 80 mg/dL (01-27-18 @ 10:51)  POCT Blood Glucose.: 79 mg/dL (01-27-18 @ 08:31)  POCT Blood Glucose.: 84 mg/dL (01-27-18 @ 07:42)  POCT Blood Glucose.: 73 mg/dL (01-27-18 @ 06:42)  POCT Blood Glucose.: 65 mg/dL (01-27-18 @ 06:04)  POCT Blood Glucose.: 167 mg/dL (01-27-18 @ 04:55)  POCT Blood Glucose.: 58 mg/dL (01-27-18 @ 04:33)  POCT Blood Glucose.: 64 mg/dL (01-27-18 @ 03:14)  POCT Blood Glucose.: 172 mg/dL (01-26-18 @ 23:23)  POCT Blood Glucose.: 246 mg/dL (01-26-18 @ 22:49)  POCT Blood Glucose.: >600 mg/dL (01-26-18 @ 22:46)  POCT Blood Glucose.: 40 mg/dL (01-26-18 @ 22:43)      01-28    138  |  102  |  18  ----------------------------<  128<H>  4.3   |  22  |  1.09    EGFR if : 72  EGFR if non : 62    Ca    8.3<L>      01-28  Mg     2.0     01-27  Phos  2.8     01-27    TPro  6.9  /  Alb  3.9  /  TBili  0.4  /  DBili  x   /  AST  24  /  ALT  12  /  AlkPhos  57  01-26          Thyroid Function Tests:      Hemoglobin A1C, Whole Blood: 6.4 % <H> [4.0 - 5.6] (01-27-18 @ 09:02)
Patient denies CP, SOB, ROS (-) feels better  	  MEDICATIONS:  MEDICATIONS  (STANDING):  ALBUTerol/ipratropium for Nebulization 3 milliLiter(s) Nebulizer every 6 hours  amLODIPine   Tablet 10 milliGRAM(s) Oral daily  aspirin enteric coated 81 milliGRAM(s) Oral daily  atorvastatin 10 milliGRAM(s) Oral at bedtime  dextrose 5%. 1000 milliLiter(s) (50 mL/Hr) IV Continuous <Continuous>  dextrose 50% Injectable 12.5 Gram(s) IV Push once  dextrose 50% Injectable 25 Gram(s) IV Push once  dextrose 50% Injectable 25 Gram(s) IV Push once  dextrose 50% Injectable 12.5 Gram(s) IV Push once  dextrose 50% Injectable 25 Gram(s) IV Push once  dextrose 50% Injectable 25 Gram(s) IV Push once  finasteride 5 milliGRAM(s) Oral daily  heparin  Injectable 5000 Unit(s) SubCutaneous every 8 hours  insulin lispro (HumaLOG) corrective regimen sliding scale   SubCutaneous three times a day before meals  insulin lispro (HumaLOG) corrective regimen sliding scale   SubCutaneous at bedtime  levothyroxine 125 MICROGram(s) Oral daily  oseltamivir 30 milliGRAM(s) Oral every 12 hours  predniSONE   Tablet 5 milliGRAM(s) Oral daily  tamsulosin 0.8 milliGRAM(s) Oral at bedtime      LABS:	 	    CARDIAC MARKERS:  CARDIAC MARKERS ( 28 Jan 2018 22:24 )  x     / <0.01 ng/mL / x     / x     / x      CARDIAC MARKERS ( 28 Jan 2018 08:58 )  x     / x     / 140 U/L / x     / x      CARDIAC MARKERS ( 26 Jan 2018 22:32 )  x     / <0.01 ng/mL / 193 U/L / x     / 3.0 ng/mL                                13.2   5.2   )-----------( 167      ( 29 Jan 2018 11:36 )             38.2     Hemoglobin: 13.2 g/dL (01-29 @ 11:36)  Hemoglobin: 12.9 g/dL (01-28 @ 08:48)  Hemoglobin: 11.8 g/dL (01-27 @ 09:02)  Hemoglobin: 12.7 g/dL (01-26 @ 22:31)      01-28    138  |  102  |  18  ----------------------------<  128<H>  4.3   |  22  |  1.09    Ca    8.3<L>      28 Jan 2018 08:58      Creatinine Trend: 1.09<--, 1.12<--, 1.27<--      TSH: Thyroid Stimulating Hormone, Serum: 0.32 uIU/mL (01-29 @ 13:36)        PHYSICAL EXAM:  T(C): 36.7 (01-29-18 @ 11:40), Max: 36.8 (01-28-18 @ 20:28)  HR: 73 (01-29-18 @ 11:40) (63 - 73)  BP: 132/75 (01-29-18 @ 11:40) (113/61 - 132/75)  RR: 17 (01-29-18 @ 11:40) (16 - 19)  SpO2: 93% (01-29-18 @ 11:40) (93% - 96%)  Wt(kg): --  I&O's Summary    28 Jan 2018 07:01  -  29 Jan 2018 07:00  --------------------------------------------------------  IN: 1330 mL / OUT: 1075 mL / NET: 255 mL    29 Jan 2018 07:01  -  29 Jan 2018 20:01  --------------------------------------------------------  IN: 500 mL / OUT: 350 mL / NET: 150 mL          HEENT:   Normal oral mucosa, PERRL, EOMI	  Lymphatic: No obvious lymphadenopathy , no edema  Cardiovascular: Normal S1 S2, No JVD, 1/6 MAXINE murmur, Peripheral pulses palpable 2+ bilaterally  Respiratory: Lungs clear to auscultation, normal effort 	  Gastrointestinal:  Soft, Non-tender, + BS	  Skin: No rashes,  No cyanosis, warm to touch  Musculoskeletal: Normal range of motion, normal strength  Psychiatry:  Appropriate Mood & affect     TELEMETRY: 	  OFF      ASSESSMENT/PLAN: 	84y Male CAD s/p POBA in 1995, HTN, HLD admitted with influenza.     -The patient has no anginal symptoms or clinical heart failure  -recommend asa for history of POBA  -no further cardiac workup needed at this time  -continue with medical management of cad  -treatment of influenza per primary team    Alden Watkins MD, FACC  [facility], [facility]  [address] [place] [address]  PHONE:  [phone]  BEEPER : [phone]
Patient is a 84y old  Male who presents with a chief complaint of fever, cough (2018 04:51)      SUBJECTIVE / OVERNIGHT EVENTS:  Overnight, his FS continues to be low despite d50, oral glucose and d10 gtt.   FS: 56 -> D50 -> 107 -> 65 -> D10 + oral glucose -> 74 -> 84 -> 79  s/p MICU eval - no indication for unit at this time  Pt himself complains of generalized malaise and bodyache but denies fever/chills, cough, dizziness, lightheadedness, diaphoresis, palpitation.  He has good appetite finally this morning and had some breakfast.      MEDICATIONS  (STANDING):  aspirin enteric coated 81 milliGRAM(s) Oral daily  atorvastatin 10 milliGRAM(s) Oral at bedtime  dextrose 10%. 500 milliLiter(s) (50 mL/Hr) IV Continuous <Continuous>  dextrose 10%. 1000 milliLiter(s) (40 mL/Hr) IV Continuous <Continuous>  dextrose 5%. 1000 milliLiter(s) (50 mL/Hr) IV Continuous <Continuous>  dextrose 50% Injectable 12.5 Gram(s) IV Push once  dextrose 50% Injectable 25 Gram(s) IV Push once  dextrose 50% Injectable 25 Gram(s) IV Push once  finasteride 5 milliGRAM(s) Oral daily  heparin  Injectable 5000 Unit(s) SubCutaneous every 8 hours  levothyroxine 125 MICROGram(s) Oral daily  oseltamivir 30 milliGRAM(s) Oral every 12 hours  predniSONE   Tablet 5 milliGRAM(s) Oral daily  tamsulosin 0.8 milliGRAM(s) Oral at bedtime    MEDICATIONS  (PRN):  acetaminophen   Tablet 650 milliGRAM(s) Oral every 6 hours PRN For Temp greater than 38 C (100.4 F)  ALBUTerol/ipratropium for Nebulization 3 milliLiter(s) Nebulizer every 6 hours PRN Shortness of Breath and/or Wheezing  dextrose Gel 1 Dose(s) Oral once PRN Blood Glucose LESS THAN 70 milliGRAM(s)/deciliter  famotidine    Tablet 20 milliGRAM(s) Oral at bedtime PRN Reflux symptoms  glucagon  Injectable 1 milliGRAM(s) IntraMuscular once PRN Glucose LESS THAN 70 milligrams/deciliter  guaiFENesin   Syrup  (Sugar-Free) 200 milliGRAM(s) Oral every 6 hours PRN Cough      CAPILLARY BLOOD GLUCOSE      POCT Blood Glucose.: 64 mg/dL (2018 03:14)  POCT Blood Glucose.: 172 mg/dL (2018 23:23)  POCT Blood Glucose.: 246 mg/dL (2018 22:49)  POCT Blood Glucose.: >600 mg/dL (2018 22:46)  POCT Blood Glucose.: 40 mg/dL (2018 22:43)    I&O's Summary      PHYSICAL EXAM:  Vital Signs Last 24 Hrs  T(C): 37.2 (2018 13:31), Max: 37.4 (2018 03:16)  T(F): 98.9 (2018 13:31), Max: 99.4 (2018 03:16)  HR: 62 (2018 13:31) (60 - 88)  BP: 149/77 (2018 13:31) (116/72 - 149/77)  BP(mean): --  RR: 18 (2018 13:31) (17 - 18)  SpO2: 95% (2018 13:31) (95% - 98%)    GENERAL: in mild distress; well-developed  HEAD:  Atraumatic, Normocephalic  EYES: EOMI, PERRLA, conjunctiva and sclera clear  NECK: Supple, No JVD  CHEST/LUNG: Clear to auscultation bilaterally; No wheeze  HEART: Regular rate and rhythm; No murmurs, rubs, or gallops  ABDOMEN: Soft, Nontender, Nondistended; Bowel sounds present  EXTREMITIES:  2+ Peripheral Pulses, No clubbing, cyanosis, or edema  NEUROLOGY: aaox3; non-focal  SKIN: No rashes or lesions    LABS:  personally reviewed                        11.8   5.04  )-----------( 189      ( 2018 09:02 )             36.3         138  |  104  |  19  ----------------------------<  56<L>  4.0   |  22  |  1.12    Ca    8.3<L>      2018 09:09  Phos  2.8       Mg     2.0         TPro  6.9  /  Alb  3.9  /  TBili  0.4  /  DBili  x   /  AST  24  /  ALT  12  /  AlkPhos  57  -      CARDIAC MARKERS ( 2018 22:32 )  x     / <0.01 ng/mL / 193 U/L / x     / 3.0 ng/mL      Urinalysis Basic - ( 2018 02:09 )    Color: PL Yellow / Appearance: Clear / S.011 / pH: x  Gluc: x / Ketone: Negative  / Bili: Negative / Urobili: Negative   Blood: x / Protein: Trace / Nitrite: Negative   Leuk Esterase: Moderate / RBC: 3-5 /HPF / WBC 11-25 /HPF   Sq Epi: x / Non Sq Epi: OCC /HPF / Bacteria: x        RADIOLOGY & ADDITIONAL TESTS:    Imaging Personally Reviewed:    Consultant(s) Notes Reviewed:      Care Discussed with Consultants/Other Providers: Dr. Coronel (endo)
Patient is a 84y old  Male who presents with a chief complaint of fever, cough (27 Jan 2018 04:51)      SUBJECTIVE / OVERNIGHT EVENTS: Patient says overall he is starting to feels better. Occasional chills and myalgias No chest pain or sobl    MEDICATIONS  (STANDING):  ALBUTerol/ipratropium for Nebulization 3 milliLiter(s) Nebulizer every 6 hours  amLODIPine   Tablet 10 milliGRAM(s) Oral daily  aspirin enteric coated 81 milliGRAM(s) Oral daily  atorvastatin 10 milliGRAM(s) Oral at bedtime  dextrose 5%. 1000 milliLiter(s) (50 mL/Hr) IV Continuous <Continuous>  dextrose 50% Injectable 12.5 Gram(s) IV Push once  dextrose 50% Injectable 25 Gram(s) IV Push once  dextrose 50% Injectable 25 Gram(s) IV Push once  dextrose 50% Injectable 12.5 Gram(s) IV Push once  dextrose 50% Injectable 25 Gram(s) IV Push once  dextrose 50% Injectable 25 Gram(s) IV Push once  finasteride 5 milliGRAM(s) Oral daily  heparin  Injectable 5000 Unit(s) SubCutaneous every 8 hours  insulin lispro (HumaLOG) corrective regimen sliding scale   SubCutaneous three times a day before meals  insulin lispro (HumaLOG) corrective regimen sliding scale   SubCutaneous at bedtime  levothyroxine 125 MICROGram(s) Oral daily  oseltamivir 30 milliGRAM(s) Oral every 12 hours  predniSONE   Tablet 5 milliGRAM(s) Oral daily  tamsulosin 0.8 milliGRAM(s) Oral at bedtime    MEDICATIONS  (PRN):  acetaminophen   Tablet 650 milliGRAM(s) Oral every 6 hours PRN For Temp greater than 38 C (100.4 F)  ALBUTerol/ipratropium for Nebulization 3 milliLiter(s) Nebulizer every 6 hours PRN Shortness of Breath and/or Wheezing  dextrose Gel 1 Dose(s) Oral once PRN Blood Glucose LESS THAN 70 milliGRAM(s)/deciliter  dextrose Gel 1 Dose(s) Oral once PRN Blood Glucose LESS THAN 70 milliGRAM(s)/deciliter  famotidine    Tablet 20 milliGRAM(s) Oral at bedtime PRN Reflux symptoms  glucagon  Injectable 1 milliGRAM(s) IntraMuscular once PRN Glucose LESS THAN 70 milligrams/deciliter  glucagon  Injectable 1 milliGRAM(s) IntraMuscular once PRN Glucose LESS THAN 70 milligrams/deciliter  guaiFENesin   Syrup  (Sugar-Free) 200 milliGRAM(s) Oral every 6 hours PRN Cough      Vital Signs Last 24 Hrs  T(C): 36.7 (29 Jan 2018 11:40), Max: 37.1 (28 Jan 2018 13:25)  T(F): 98 (29 Jan 2018 11:40), Max: 98.7 (28 Jan 2018 13:25)  HR: 73 (29 Jan 2018 11:40) (63 - 73)  BP: 132/75 (29 Jan 2018 11:40) (113/61 - 132/75)  BP(mean): --  RR: 17 (29 Jan 2018 11:40) (16 - 19)  SpO2: 93% (29 Jan 2018 11:40) (93% - 96%)  CAPILLARY BLOOD GLUCOSE      POCT Blood Glucose.: 227 mg/dL (29 Jan 2018 11:30)  POCT Blood Glucose.: 159 mg/dL (29 Jan 2018 07:41)  POCT Blood Glucose.: 188 mg/dL (28 Jan 2018 20:58)  POCT Blood Glucose.: 175 mg/dL (28 Jan 2018 16:12)    I&O's Summary    28 Jan 2018 07:01  -  29 Jan 2018 07:00  --------------------------------------------------------  IN: 1330 mL / OUT: 1075 mL / NET: 255 mL        PHYSICAL EXAM:  GENERAL: NAD, well-developed  HEAD:  Atraumatic, Normocephalic  NECK: Supple, No JVD  CHEST/LUNG: rhonci L> R  HEART: Regular rate and rhythm; No murmurs, rubs, or gallops  ABDOMEN: Soft, Nontender, Nondistended; Bowel sounds present  EXTREMITIES:  2+ Peripheral Pulses, No clubbing, cyanosis, or edema  PSYCH: AAOx3  NEUROLOGY: non-focal  SKIN: No rashes or lesions    LABS:                        13.2   5.2   )-----------( 167      ( 29 Jan 2018 11:36 )             38.2     01-28    138  |  102  |  18  ----------------------------<  128<H>  4.3   |  22  |  1.09    Ca    8.3<L>      28 Jan 2018 08:58        CARDIAC MARKERS ( 28 Jan 2018 22:24 )  x     / <0.01 ng/mL / x     / x     / x      CARDIAC MARKERS ( 28 Jan 2018 08:58 )  x     / x     / 140 U/L / x     / x                  RADIOLOGY & ADDITIONAL TESTS:    Imaging Personally Reviewed:    Consultant(s) Notes Reviewed:  endocrine,cards    Care Discussed with Consultants/Other Providers:
S: no chest pain or sob       acetaminophen   Tablet 650 milliGRAM(s) Oral every 6 hours PRN  ALBUTerol    90 MICROgram(s) HFA Inhaler 2 Puff(s) Inhalation every 6 hours PRN  ALBUTerol/ipratropium for Nebulization 3 milliLiter(s) Nebulizer every 6 hours  ALBUTerol/ipratropium for Nebulization 3 milliLiter(s) Nebulizer every 6 hours PRN  amLODIPine   Tablet 10 milliGRAM(s) Oral daily  aspirin enteric coated 81 milliGRAM(s) Oral daily  atorvastatin 10 milliGRAM(s) Oral at bedtime  dextrose 5%. 1000 milliLiter(s) IV Continuous <Continuous>  dextrose 50% Injectable 12.5 Gram(s) IV Push once  dextrose 50% Injectable 25 Gram(s) IV Push once  dextrose 50% Injectable 25 Gram(s) IV Push once  dextrose 50% Injectable 12.5 Gram(s) IV Push once  dextrose 50% Injectable 25 Gram(s) IV Push once  dextrose 50% Injectable 25 Gram(s) IV Push once  dextrose Gel 1 Dose(s) Oral once PRN  dextrose Gel 1 Dose(s) Oral once PRN  famotidine    Tablet 20 milliGRAM(s) Oral at bedtime PRN  finasteride 5 milliGRAM(s) Oral daily  glucagon  Injectable 1 milliGRAM(s) IntraMuscular once PRN  glucagon  Injectable 1 milliGRAM(s) IntraMuscular once PRN  guaiFENesin   Syrup  (Sugar-Free) 200 milliGRAM(s) Oral every 6 hours PRN  heparin  Injectable 5000 Unit(s) SubCutaneous every 8 hours  insulin lispro (HumaLOG) corrective regimen sliding scale   SubCutaneous three times a day before meals  insulin lispro (HumaLOG) corrective regimen sliding scale   SubCutaneous at bedtime  levothyroxine 112 MICROGram(s) Oral daily  oseltamivir 30 milliGRAM(s) Oral every 12 hours  predniSONE   Tablet 5 milliGRAM(s) Oral daily  tamsulosin 0.8 milliGRAM(s) Oral at bedtime                            13.2   5.2   )-----------( 167      ( 29 Jan 2018 11:36 )             38.2               CARDIAC MARKERS ( 28 Jan 2018 22:24 )  x     / <0.01 ng/mL / x     / x     / x      CARDIAC MARKERS ( 28 Jan 2018 08:58 )  x     / x     / 140 U/L / x     / 1.6 ng/mL        T(C): 37 (01-30-18 @ 11:20), Max: 37 (01-30-18 @ 11:20)  HR: 75 (01-30-18 @ 13:52) (70 - 75)  BP: 144/80 (01-30-18 @ 13:52) (135/74 - 153/75)  RR: 17 (01-30-18 @ 13:52) (16 - 17)  SpO2: 98% (01-30-18 @ 13:52) (93% - 98%)  Wt(kg): --    I&O's Summary    29 Jan 2018 07:01  -  30 Jan 2018 07:00  --------------------------------------------------------  IN: 950 mL / OUT: 350 mL / NET: 600 mL    30 Jan 2018 07:01  -  30 Jan 2018 14:00  --------------------------------------------------------  IN: 240 mL / OUT: 0 mL / NET: 240 mL        Heart: normal S1, S2, RRR, no m/r/g  Lungs: cta b/l  Abd: soft nT,nD  Ext: no edema       TELEMETRY: 	  OFF      ASSESSMENT/PLAN: 	84y Male CAD s/p POBA in 1995, HTN, HLD admitted with influenza.     -no anginal symptoms or clinical heart failure  -continue with asa for history of cad  -no further cardiac workup needed at this time  -dc planning per primary team    Mayra Benitez MD  [facility]  [address], [id number]  [place] [address]  office: [phone]  pager: [phone]
Patient is a 84y old  Male who presents with a chief complaint of fever, cough (30 Jan 2018 11:29)      SUBJECTIVE / OVERNIGHT EVENTS: Patient feels well. Denies any chest pain, sob, nausea, vomiting. No coughing. Able to ambulate in room and hallways without any symptoms. Tolerating PO well.    MEDICATIONS  (STANDING):  ALBUTerol/ipratropium for Nebulization 3 milliLiter(s) Nebulizer every 6 hours  amLODIPine   Tablet 10 milliGRAM(s) Oral daily  aspirin enteric coated 81 milliGRAM(s) Oral daily  atorvastatin 10 milliGRAM(s) Oral at bedtime  dextrose 5%. 1000 milliLiter(s) (50 mL/Hr) IV Continuous <Continuous>  dextrose 50% Injectable 12.5 Gram(s) IV Push once  dextrose 50% Injectable 25 Gram(s) IV Push once  dextrose 50% Injectable 25 Gram(s) IV Push once  dextrose 50% Injectable 12.5 Gram(s) IV Push once  dextrose 50% Injectable 25 Gram(s) IV Push once  dextrose 50% Injectable 25 Gram(s) IV Push once  finasteride 5 milliGRAM(s) Oral daily  heparin  Injectable 5000 Unit(s) SubCutaneous every 8 hours  insulin lispro (HumaLOG) corrective regimen sliding scale   SubCutaneous three times a day before meals  insulin lispro (HumaLOG) corrective regimen sliding scale   SubCutaneous at bedtime  levothyroxine 112 MICROGram(s) Oral daily  oseltamivir 30 milliGRAM(s) Oral every 12 hours  predniSONE   Tablet 5 milliGRAM(s) Oral daily  tamsulosin 0.8 milliGRAM(s) Oral at bedtime    MEDICATIONS  (PRN):  acetaminophen   Tablet 650 milliGRAM(s) Oral every 6 hours PRN For Temp greater than 38 C (100.4 F)  ALBUTerol/ipratropium for Nebulization 3 milliLiter(s) Nebulizer every 6 hours PRN Shortness of Breath and/or Wheezing  dextrose Gel 1 Dose(s) Oral once PRN Blood Glucose LESS THAN 70 milliGRAM(s)/deciliter  dextrose Gel 1 Dose(s) Oral once PRN Blood Glucose LESS THAN 70 milliGRAM(s)/deciliter  famotidine    Tablet 20 milliGRAM(s) Oral at bedtime PRN Reflux symptoms  glucagon  Injectable 1 milliGRAM(s) IntraMuscular once PRN Glucose LESS THAN 70 milligrams/deciliter  glucagon  Injectable 1 milliGRAM(s) IntraMuscular once PRN Glucose LESS THAN 70 milligrams/deciliter  guaiFENesin   Syrup  (Sugar-Free) 200 milliGRAM(s) Oral every 6 hours PRN Cough      Vital Signs Last 24 Hrs  T(C): 36.7 (30 Jan 2018 04:41), Max: 36.8 (29 Jan 2018 20:08)  T(F): 98 (30 Jan 2018 04:41), Max: 98.2 (29 Jan 2018 20:08)  HR: 74 (30 Jan 2018 11:12) (70 - 75)  BP: 147/74 (30 Jan 2018 11:12) (135/74 - 153/75)  BP(mean): --  RR: 16 (30 Jan 2018 04:41) (16 - 17)  SpO2: 97% (30 Jan 2018 11:12) (93% - 97%)  CAPILLARY BLOOD GLUCOSE      POCT Blood Glucose.: 248 mg/dL (30 Jan 2018 11:42)  POCT Blood Glucose.: 162 mg/dL (30 Jan 2018 07:17)  POCT Blood Glucose.: 179 mg/dL (29 Jan 2018 21:12)  POCT Blood Glucose.: 119 mg/dL (29 Jan 2018 15:58)    I&O's Summary    29 Jan 2018 07:01  -  30 Jan 2018 07:00  --------------------------------------------------------  IN: 950 mL / OUT: 350 mL / NET: 600 mL    30 Jan 2018 07:01  -  30 Jan 2018 11:48  --------------------------------------------------------  IN: 240 mL / OUT: 0 mL / NET: 240 mL        PHYSICAL EXAM:  GENERAL: NAD, well-developed in chair breathing comfortably on room air  HEAD:  Atraumatic, Normocephalic  NECK: Supple, No JVD  CHEST/LUNG: scattered rhonci  HEART: Regular rate and rhythm; No murmurs, rubs, or gallops  ABDOMEN: Soft, Nontender, Nondistended; Bowel sounds present  EXTREMITIES:  2+ Peripheral Pulses, No clubbing, cyanosis, or edema  PSYCH: AAOx3  NEUROLOGY: non-focal      LABS:                        13.2   5.2   )-----------( 167      ( 29 Jan 2018 11:36 )             38.2             CARDIAC MARKERS ( 28 Jan 2018 22:24 )  x     / <0.01 ng/mL / x     / x     / x                  RADIOLOGY & ADDITIONAL TESTS:    Imaging Personally Reviewed:    Consultant(s) Notes Reviewed:      Care Discussed with Consultants/Other Providers:

## 2018-01-30 NOTE — PROGRESS NOTE ADULT - ASSESSMENT
83 y/o M PMH HTN, HLD, DM2 on oral agents, hypothyroidism presents with hypoglycemia in setting of sulfonylurea use with reduced po intake
85 yo man with PM of CAD (s/p angioplasty no stent), HTN, HLD, BPH, on chronic prednisone presenting from home in setting of cough, fever, body aches and generalized fatigue in the past 4 days found with Influenza A and with persistent hypoglycemia (now resolved).
85 y/o M PMH HTN, HLD, DM2 on oral agents, hypothyroidism presents with hypoglycemia in setting of sulfonylurea use with reduced po intake
85 yo man with PM of CAD (s/p angioplasty no stent), HTN, HLD, BPH, on chronic prednisone presenting from home in setting of cough, fever, body aches and generalized fatigue in the past 4 days found with Influenza A and with persistent hypoglycemia (now resolved).
85 yo man with PM of CAD (s/p angioplasty no stent), HTN, HLD, BPH, on chronic prednisone presenting from home in setting of cough, fever, body aches and generalized fatigue in the past 4 days found with Influenza A and with persistent hypoglycemia.
83 yo man with PM of CAD (s/p angioplasty no stent), HTN, HLD, BPH, on chronic prednisone presenting from home in setting of cough, fever, body aches and generalized fatigue in the past 4 days found with Influenza A and with  hypoglycemia (now resolved).

## 2018-01-30 NOTE — DISCHARGE NOTE ADULT - CARE PLAN
Principal Discharge DX:	Influenza A  Goal:	Remain without fever  Assessment and plan of treatment:	Drinking electrolyte-replacing fluids, not sugary sodas or sports drinks, regularly will prevent dehydration (abnormal loss of body fluids). Acetaminophen (Tylenol, for example) will help to reduce fever and relieve headache. Good handwashing can prevent spread the virus.  Continue your medications as prescribed.  Secondary Diagnosis:	Hypoglycemia associated with type 2 diabetes mellitus  Assessment and plan of treatment:	HgA1C this admission.  Make sure you get your HgA1c checked every three months.  If you take oral diabetes medications, check your blood glucose two times a day.  If you take insulin, check your blood glucose before meals and at bedtime.  It's important not to skip any meals.  Keep a log of your blood glucose results and always take it with you to your doctor appointments.  Keep a list of your current medications including injectables and over the counter medications and bring this medication list with you to all your doctor appointments.  If you have not seen your ophthalmologist this year call for appointment.  Check your feet daily for redness, sores, or openings. Do not self treat. If no improvement in two days call your primary care physician for an appointment.  Low blood sugar (hypoglycemia) is a blood sugar below 70mg/dl. Check your blood sugar if you feel signs/symptoms of hypoglycemia. If your blood sugar is below 70 take 15 grams of carbohydrates (ex 4 oz of apple juice, 3-4 glucose tablets, or 4-6 oz of regular soda) wait 15 minutes and repeat blood sugar to make sure it comes up above 70.  If your blood sugar is above 70 and you are due for a meal, have a meal.  If you are not due for a meal have a snack.  This snack helps keeps your blood sugar at a safe range.  Secondary Diagnosis:	Essential hypertension  Assessment and plan of treatment:	Low salt diet  Activity as tolerated.  Take all medication as prescribed.  Follow up with your medical doctor for routine blood pressure monitoring at your next visit.  Notify your doctor if you have any of the following symptoms:   Dizziness, Lightheadedness, Blurry vision, Headache, Chest pain, Shortness of breath

## 2018-01-30 NOTE — PROGRESS NOTE ADULT - PROBLEM SELECTOR PLAN 7
DVT ppx: HSQ  dispo: PT rec no skilled PT needs, no need for home 02 as 02 sat 97% on RA  Discharge home today, outpatient pcp, cards, endocrine followup  discharge time 40 min
DVT ppx: HSQ
DVT ppx: HSQ  dispo: pending PT eval

## 2018-02-06 LAB
CORTICOSTEROID BINDING GLOBULIN RESULT: 2.1 MG/DL — SIGNIFICANT CHANGE UP
CORTIS F/TOTAL MFR SERPL: 4.3 % — SIGNIFICANT CHANGE UP
CORTIS SERPL-MCNC: 6.6 UG/DL — SIGNIFICANT CHANGE UP
CORTISOL, FREE RESULT: 0.28 UG/DL — SIGNIFICANT CHANGE UP

## 2018-08-01 ENCOUNTER — APPOINTMENT (OUTPATIENT)
Dept: ORTHOPEDIC SURGERY | Facility: CLINIC | Age: 83
End: 2018-08-01
Payer: MEDICARE

## 2018-08-01 VITALS
HEART RATE: 77 BPM | WEIGHT: 167 LBS | SYSTOLIC BLOOD PRESSURE: 138 MMHG | DIASTOLIC BLOOD PRESSURE: 74 MMHG | BODY MASS INDEX: 26.84 KG/M2 | HEIGHT: 66 IN

## 2018-08-01 DIAGNOSIS — S42.293P: ICD-10-CM

## 2018-08-01 PROCEDURE — 99201 OFFICE OUTPATIENT NEW 10 MINUTES: CPT

## 2018-08-13 ENCOUNTER — APPOINTMENT (OUTPATIENT)
Dept: ORTHOPEDIC SURGERY | Facility: CLINIC | Age: 83
End: 2018-08-13
Payer: MEDICARE

## 2018-08-13 VITALS — HEIGHT: 66 IN | WEIGHT: 167 LBS | BODY MASS INDEX: 26.84 KG/M2

## 2018-08-13 DIAGNOSIS — M65.352 TRIGGER FINGER, LEFT LITTLE FINGER: ICD-10-CM

## 2018-08-13 DIAGNOSIS — Z78.9 OTHER SPECIFIED HEALTH STATUS: ICD-10-CM

## 2018-08-13 DIAGNOSIS — Z86.79 PERSONAL HISTORY OF OTHER DISEASES OF THE CIRCULATORY SYSTEM: ICD-10-CM

## 2018-08-13 DIAGNOSIS — M65.342 TRIGGER FINGER, LEFT RING FINGER: ICD-10-CM

## 2018-08-13 PROCEDURE — 20600 DRAIN/INJ JOINT/BURSA W/O US: CPT | Mod: F2

## 2018-08-13 PROCEDURE — 99214 OFFICE O/P EST MOD 30 MIN: CPT | Mod: 25

## 2018-08-13 RX ORDER — FAMOTIDINE 10 MG/1
TABLET, FILM COATED ORAL
Refills: 0 | Status: ACTIVE | COMMUNITY

## 2018-08-13 RX ORDER — LEVOCETIRIZINE DIHYDROCHLORIDE 5 MG/1
TABLET ORAL
Refills: 0 | Status: ACTIVE | COMMUNITY

## 2018-08-13 RX ORDER — AZELASTINE HYDROCHLORIDE 137 UG/1
SPRAY, METERED NASAL
Refills: 0 | Status: ACTIVE | COMMUNITY

## 2018-08-13 RX ORDER — CLOTRIMAZOLE 10 MG/G
CREAM VAGINAL
Refills: 0 | Status: ACTIVE | COMMUNITY

## 2018-08-13 RX ORDER — AMLODIPINE BESYLATE 5 MG/1
TABLET ORAL
Refills: 0 | Status: ACTIVE | COMMUNITY

## 2018-08-13 RX ORDER — FINASTERIDE 1 MG/1
TABLET ORAL
Refills: 0 | Status: ACTIVE | COMMUNITY

## 2018-08-13 RX ORDER — VALSARTAN 40 MG/1
TABLET, COATED ORAL
Refills: 0 | Status: ACTIVE | COMMUNITY

## 2018-08-13 RX ORDER — PREDNISONE 10 MG
TABLET ORAL
Refills: 0 | Status: ACTIVE | COMMUNITY

## 2018-08-13 RX ORDER — ATENOLOL 50 MG/1
TABLET ORAL
Refills: 0 | Status: ACTIVE | COMMUNITY

## 2018-08-13 RX ORDER — GLIMEPIRIDE 4 MG/1
TABLET ORAL
Refills: 0 | Status: ACTIVE | COMMUNITY

## 2018-08-13 RX ORDER — TAMSULOSIN HYDROCHLORIDE 0.4 MG/1
0.4 CAPSULE ORAL
Refills: 0 | Status: ACTIVE | COMMUNITY

## 2018-08-13 RX ORDER — FLUTICASONE PROPIONATE 50 UG/1
50 SPRAY, METERED NASAL
Refills: 0 | Status: ACTIVE | COMMUNITY

## 2018-08-13 RX ORDER — LEVOTHYROXINE SODIUM 0.17 MG/1
TABLET ORAL
Refills: 0 | Status: ACTIVE | COMMUNITY

## 2018-08-13 RX ORDER — ATORVASTATIN CALCIUM 80 MG/1
TABLET, FILM COATED ORAL
Refills: 0 | Status: ACTIVE | COMMUNITY

## 2018-11-27 ENCOUNTER — TRANSCRIPTION ENCOUNTER (OUTPATIENT)
Age: 83
End: 2018-11-27

## 2018-12-22 ENCOUNTER — INPATIENT (INPATIENT)
Facility: HOSPITAL | Age: 83
LOS: 1 days | Discharge: HOME | DRG: 312 | End: 2018-12-24
Attending: HOSPITALIST | Admitting: HOSPITALIST
Payer: MEDICARE

## 2018-12-22 VITALS
HEIGHT: 66 IN | DIASTOLIC BLOOD PRESSURE: 80 MMHG | HEART RATE: 63 BPM | WEIGHT: 149.91 LBS | SYSTOLIC BLOOD PRESSURE: 153 MMHG | TEMPERATURE: 98 F | OXYGEN SATURATION: 99 % | RESPIRATION RATE: 18 BRPM

## 2018-12-22 DIAGNOSIS — N17.9 ACUTE KIDNEY FAILURE, UNSPECIFIED: ICD-10-CM

## 2018-12-22 DIAGNOSIS — Z29.9 ENCOUNTER FOR PROPHYLACTIC MEASURES, UNSPECIFIED: ICD-10-CM

## 2018-12-22 DIAGNOSIS — R55 SYNCOPE AND COLLAPSE: ICD-10-CM

## 2018-12-22 DIAGNOSIS — I10 ESSENTIAL (PRIMARY) HYPERTENSION: ICD-10-CM

## 2018-12-22 DIAGNOSIS — N40.0 BENIGN PROSTATIC HYPERPLASIA WITHOUT LOWER URINARY TRACT SYMPTOMS: ICD-10-CM

## 2018-12-22 DIAGNOSIS — E03.9 HYPOTHYROIDISM, UNSPECIFIED: ICD-10-CM

## 2018-12-22 DIAGNOSIS — D72.829 ELEVATED WHITE BLOOD CELL COUNT, UNSPECIFIED: ICD-10-CM

## 2018-12-22 DIAGNOSIS — E11.9 TYPE 2 DIABETES MELLITUS WITHOUT COMPLICATIONS: ICD-10-CM

## 2018-12-22 DIAGNOSIS — R63.4 ABNORMAL WEIGHT LOSS: ICD-10-CM

## 2018-12-22 DIAGNOSIS — I25.10 ATHEROSCLEROTIC HEART DISEASE OF NATIVE CORONARY ARTERY WITHOUT ANGINA PECTORIS: ICD-10-CM

## 2018-12-22 LAB
ALBUMIN SERPL ELPH-MCNC: 4.4 G/DL — SIGNIFICANT CHANGE UP (ref 3.3–5)
ALP SERPL-CCNC: 61 U/L — SIGNIFICANT CHANGE UP (ref 40–120)
ALT FLD-CCNC: 13 U/L — SIGNIFICANT CHANGE UP (ref 10–45)
ANION GAP SERPL CALC-SCNC: 15 MMOL/L — SIGNIFICANT CHANGE UP (ref 5–17)
APPEARANCE UR: CLEAR — SIGNIFICANT CHANGE UP
AST SERPL-CCNC: 21 U/L — SIGNIFICANT CHANGE UP (ref 10–40)
BACTERIA # UR AUTO: NEGATIVE — SIGNIFICANT CHANGE UP
BASOPHILS # BLD AUTO: 0.1 K/UL — SIGNIFICANT CHANGE UP (ref 0–0.2)
BASOPHILS NFR BLD AUTO: 0.6 % — SIGNIFICANT CHANGE UP (ref 0–2)
BILIRUB SERPL-MCNC: 0.9 MG/DL — SIGNIFICANT CHANGE UP (ref 0.2–1.2)
BILIRUB UR-MCNC: NEGATIVE — SIGNIFICANT CHANGE UP
BUN SERPL-MCNC: 27 MG/DL — HIGH (ref 7–23)
CALCIUM SERPL-MCNC: 9.5 MG/DL — SIGNIFICANT CHANGE UP (ref 8.4–10.5)
CHLORIDE SERPL-SCNC: 101 MMOL/L — SIGNIFICANT CHANGE UP (ref 96–108)
CO2 SERPL-SCNC: 23 MMOL/L — SIGNIFICANT CHANGE UP (ref 22–31)
COLOR SPEC: YELLOW — SIGNIFICANT CHANGE UP
CREAT SERPL-MCNC: 1.41 MG/DL — HIGH (ref 0.5–1.3)
DIFF PNL FLD: NEGATIVE — SIGNIFICANT CHANGE UP
EOSINOPHIL # BLD AUTO: 0.1 K/UL — SIGNIFICANT CHANGE UP (ref 0–0.5)
EOSINOPHIL NFR BLD AUTO: 0.7 % — SIGNIFICANT CHANGE UP (ref 0–6)
EPI CELLS # UR: 1 /HPF — SIGNIFICANT CHANGE UP
GLUCOSE SERPL-MCNC: 191 MG/DL — HIGH (ref 70–99)
GLUCOSE UR QL: NEGATIVE — SIGNIFICANT CHANGE UP
GRAN CASTS # UR COMP ASSIST: 1 /LPF — SIGNIFICANT CHANGE UP
HCT VFR BLD CALC: 38.3 % — LOW (ref 39–50)
HGB BLD-MCNC: 13.2 G/DL — SIGNIFICANT CHANGE UP (ref 13–17)
HYALINE CASTS # UR AUTO: 8 /LPF — HIGH (ref 0–2)
KETONES UR-MCNC: NEGATIVE — SIGNIFICANT CHANGE UP
LEUKOCYTE ESTERASE UR-ACNC: NEGATIVE — SIGNIFICANT CHANGE UP
LYMPHOCYTES # BLD AUTO: 1.7 K/UL — SIGNIFICANT CHANGE UP (ref 1–3.3)
LYMPHOCYTES # BLD AUTO: 14.1 % — SIGNIFICANT CHANGE UP (ref 13–44)
MAGNESIUM SERPL-MCNC: 1.6 MG/DL — SIGNIFICANT CHANGE UP (ref 1.6–2.6)
MCHC RBC-ENTMCNC: 32.5 PG — SIGNIFICANT CHANGE UP (ref 27–34)
MCHC RBC-ENTMCNC: 34.4 GM/DL — SIGNIFICANT CHANGE UP (ref 32–36)
MCV RBC AUTO: 94.6 FL — SIGNIFICANT CHANGE UP (ref 80–100)
MONOCYTES # BLD AUTO: 0.9 K/UL — SIGNIFICANT CHANGE UP (ref 0–0.9)
MONOCYTES NFR BLD AUTO: 7.2 % — SIGNIFICANT CHANGE UP (ref 2–14)
NEUTROPHILS # BLD AUTO: 9.5 K/UL — HIGH (ref 1.8–7.4)
NEUTROPHILS NFR BLD AUTO: 77.4 % — HIGH (ref 43–77)
NITRITE UR-MCNC: NEGATIVE — SIGNIFICANT CHANGE UP
PH UR: 6.5 — SIGNIFICANT CHANGE UP (ref 5–8)
PLATELET # BLD AUTO: 202 K/UL — SIGNIFICANT CHANGE UP (ref 150–400)
POTASSIUM SERPL-MCNC: 5 MMOL/L — SIGNIFICANT CHANGE UP (ref 3.5–5.3)
POTASSIUM SERPL-SCNC: 5 MMOL/L — SIGNIFICANT CHANGE UP (ref 3.5–5.3)
PROT SERPL-MCNC: 7 G/DL — SIGNIFICANT CHANGE UP (ref 6–8.3)
PROT UR-MCNC: ABNORMAL
RBC # BLD: 4.05 M/UL — LOW (ref 4.2–5.8)
RBC # FLD: 12.2 % — SIGNIFICANT CHANGE UP (ref 10.3–14.5)
RBC CASTS # UR COMP ASSIST: 2 /HPF — SIGNIFICANT CHANGE UP (ref 0–4)
SODIUM SERPL-SCNC: 139 MMOL/L — SIGNIFICANT CHANGE UP (ref 135–145)
SP GR SPEC: 1.02 — SIGNIFICANT CHANGE UP (ref 1.01–1.02)
TROPONIN T, HIGH SENSITIVITY RESULT: 37 NG/L — SIGNIFICANT CHANGE UP (ref 0–51)
UROBILINOGEN FLD QL: NEGATIVE — SIGNIFICANT CHANGE UP
WBC # BLD: 12.2 K/UL — HIGH (ref 3.8–10.5)
WBC # FLD AUTO: 12.2 K/UL — HIGH (ref 3.8–10.5)
WBC UR QL: 1 /HPF — SIGNIFICANT CHANGE UP (ref 0–5)

## 2018-12-22 PROCEDURE — 70450 CT HEAD/BRAIN W/O DYE: CPT | Mod: 26

## 2018-12-22 PROCEDURE — 99223 1ST HOSP IP/OBS HIGH 75: CPT | Mod: GC

## 2018-12-22 PROCEDURE — 93010 ELECTROCARDIOGRAM REPORT: CPT

## 2018-12-22 PROCEDURE — 71045 X-RAY EXAM CHEST 1 VIEW: CPT | Mod: 26

## 2018-12-22 PROCEDURE — 99285 EMERGENCY DEPT VISIT HI MDM: CPT | Mod: 25

## 2018-12-22 RX ORDER — FAMOTIDINE 10 MG/ML
20 INJECTION INTRAVENOUS AT BEDTIME
Qty: 0 | Refills: 0 | Status: DISCONTINUED | OUTPATIENT
Start: 2018-12-22 | End: 2018-12-24

## 2018-12-22 RX ORDER — HEPARIN SODIUM 5000 [USP'U]/ML
5000 INJECTION INTRAVENOUS; SUBCUTANEOUS EVERY 8 HOURS
Qty: 0 | Refills: 0 | Status: DISCONTINUED | OUTPATIENT
Start: 2018-12-22 | End: 2018-12-24

## 2018-12-22 RX ORDER — LEVOCETIRIZINE DIHYDROCHLORIDE 0.5 MG/ML
1 SOLUTION ORAL
Qty: 0 | Refills: 0 | COMMUNITY

## 2018-12-22 RX ORDER — DEXTROSE 50 % IN WATER 50 %
25 SYRINGE (ML) INTRAVENOUS ONCE
Qty: 0 | Refills: 0 | Status: DISCONTINUED | OUTPATIENT
Start: 2018-12-22 | End: 2018-12-24

## 2018-12-22 RX ORDER — SODIUM CHLORIDE 9 MG/ML
1000 INJECTION, SOLUTION INTRAVENOUS ONCE
Qty: 0 | Refills: 0 | Status: COMPLETED | OUTPATIENT
Start: 2018-12-22 | End: 2018-12-22

## 2018-12-22 RX ORDER — METFORMIN HYDROCHLORIDE 850 MG/1
1 TABLET ORAL
Qty: 0 | Refills: 0 | COMMUNITY

## 2018-12-22 RX ORDER — SODIUM CHLORIDE 9 MG/ML
1000 INJECTION, SOLUTION INTRAVENOUS
Qty: 0 | Refills: 0 | Status: DISCONTINUED | OUTPATIENT
Start: 2018-12-22 | End: 2018-12-24

## 2018-12-22 RX ORDER — DEXTROSE 50 % IN WATER 50 %
15 SYRINGE (ML) INTRAVENOUS ONCE
Qty: 0 | Refills: 0 | Status: DISCONTINUED | OUTPATIENT
Start: 2018-12-22 | End: 2018-12-24

## 2018-12-22 RX ORDER — AMLODIPINE BESYLATE 2.5 MG/1
10 TABLET ORAL DAILY
Qty: 0 | Refills: 0 | Status: DISCONTINUED | OUTPATIENT
Start: 2018-12-22 | End: 2018-12-24

## 2018-12-22 RX ORDER — LEVOTHYROXINE SODIUM 125 MCG
1 TABLET ORAL
Qty: 0 | Refills: 0 | COMMUNITY

## 2018-12-22 RX ORDER — ASPIRIN/CALCIUM CARB/MAGNESIUM 324 MG
81 TABLET ORAL DAILY
Qty: 0 | Refills: 0 | Status: DISCONTINUED | OUTPATIENT
Start: 2018-12-22 | End: 2018-12-24

## 2018-12-22 RX ORDER — ATORVASTATIN CALCIUM 80 MG/1
10 TABLET, FILM COATED ORAL AT BEDTIME
Qty: 0 | Refills: 0 | Status: DISCONTINUED | OUTPATIENT
Start: 2018-12-22 | End: 2018-12-24

## 2018-12-22 RX ORDER — FAMOTIDINE 10 MG/ML
1 INJECTION INTRAVENOUS
Qty: 0 | Refills: 0 | COMMUNITY

## 2018-12-22 RX ORDER — ASPIRIN/CALCIUM CARB/MAGNESIUM 324 MG
1 TABLET ORAL
Qty: 0 | Refills: 0 | COMMUNITY

## 2018-12-22 RX ORDER — INSULIN LISPRO 100/ML
VIAL (ML) SUBCUTANEOUS AT BEDTIME
Qty: 0 | Refills: 0 | Status: DISCONTINUED | OUTPATIENT
Start: 2018-12-22 | End: 2018-12-24

## 2018-12-22 RX ORDER — METFORMIN HYDROCHLORIDE 850 MG/1
0 TABLET ORAL
Qty: 0 | Refills: 0 | COMMUNITY

## 2018-12-22 RX ORDER — AMLODIPINE BESYLATE 2.5 MG/1
1 TABLET ORAL
Qty: 0 | Refills: 0 | COMMUNITY

## 2018-12-22 RX ORDER — ATORVASTATIN CALCIUM 80 MG/1
1 TABLET, FILM COATED ORAL
Qty: 0 | Refills: 0 | COMMUNITY

## 2018-12-22 RX ORDER — DEXTROSE 50 % IN WATER 50 %
12.5 SYRINGE (ML) INTRAVENOUS ONCE
Qty: 0 | Refills: 0 | Status: DISCONTINUED | OUTPATIENT
Start: 2018-12-22 | End: 2018-12-24

## 2018-12-22 RX ORDER — FLUTICASONE PROPIONATE 50 MCG
2 SPRAY, SUSPENSION NASAL
Qty: 0 | Refills: 0 | COMMUNITY

## 2018-12-22 RX ORDER — TAMSULOSIN HYDROCHLORIDE 0.4 MG/1
0.8 CAPSULE ORAL AT BEDTIME
Qty: 0 | Refills: 0 | Status: DISCONTINUED | OUTPATIENT
Start: 2018-12-22 | End: 2018-12-24

## 2018-12-22 RX ORDER — ATENOLOL 25 MG/1
1 TABLET ORAL
Qty: 0 | Refills: 0 | COMMUNITY

## 2018-12-22 RX ORDER — FINASTERIDE 5 MG/1
5 TABLET, FILM COATED ORAL DAILY
Qty: 0 | Refills: 0 | Status: DISCONTINUED | OUTPATIENT
Start: 2018-12-22 | End: 2018-12-24

## 2018-12-22 RX ORDER — TAMSULOSIN HYDROCHLORIDE 0.4 MG/1
2 CAPSULE ORAL
Qty: 0 | Refills: 0 | COMMUNITY

## 2018-12-22 RX ORDER — FLUTICASONE PROPIONATE 50 MCG
2 SPRAY, SUSPENSION NASAL DAILY
Qty: 0 | Refills: 0 | Status: DISCONTINUED | OUTPATIENT
Start: 2018-12-22 | End: 2018-12-24

## 2018-12-22 RX ORDER — FINASTERIDE 5 MG/1
1 TABLET, FILM COATED ORAL
Qty: 0 | Refills: 0 | COMMUNITY

## 2018-12-22 RX ORDER — ATENOLOL 25 MG/1
25 TABLET ORAL DAILY
Qty: 0 | Refills: 0 | Status: DISCONTINUED | OUTPATIENT
Start: 2018-12-22 | End: 2018-12-24

## 2018-12-22 RX ORDER — LEVOTHYROXINE SODIUM 125 MCG
125 TABLET ORAL DAILY
Qty: 0 | Refills: 0 | Status: DISCONTINUED | OUTPATIENT
Start: 2018-12-22 | End: 2018-12-24

## 2018-12-22 RX ORDER — INSULIN LISPRO 100/ML
VIAL (ML) SUBCUTANEOUS
Qty: 0 | Refills: 0 | Status: DISCONTINUED | OUTPATIENT
Start: 2018-12-22 | End: 2018-12-24

## 2018-12-22 RX ORDER — GLUCAGON INJECTION, SOLUTION 0.5 MG/.1ML
1 INJECTION, SOLUTION SUBCUTANEOUS ONCE
Qty: 0 | Refills: 0 | Status: DISCONTINUED | OUTPATIENT
Start: 2018-12-22 | End: 2018-12-24

## 2018-12-22 RX ADMIN — SODIUM CHLORIDE 1000 MILLILITER(S): 9 INJECTION, SOLUTION INTRAVENOUS at 17:39

## 2018-12-22 NOTE — ED ADULT NURSE NOTE - NSIMPLEMENTINTERV_GEN_ALL_ED
Implemented All Fall Risk Interventions:  [place] to call system. Call bell, personal items and telephone within reach. Instruct patient to call for assistance. Room bathroom lighting operational. Non-slip footwear when patient is off stretcher. Physically safe environment: no spills, clutter or unnecessary equipment. Stretcher in lowest position, wheels locked, appropriate side rails in place. Provide visual cue, wrist band, yellow gown, etc. Monitor gait and stability. Monitor for mental status changes and reorient to person, place, and time. Review medications for side effects contributing to fall risk. Reinforce activity limits and safety measures with patient and family.

## 2018-12-22 NOTE — H&P ADULT - PROBLEM SELECTOR PLAN 7
- C/w Amlodipine 10 mg/d and Atenolol 25 mg/d  - Holding Valsartan in setting of ELLIS  - Trend BP per routine

## 2018-12-22 NOTE — ED PROVIDER NOTE - CPE EDP CARDIAC NORM
Chief Complaint   Patient presents with    Cold Symptoms     chills, cough and fever of 104 for 1 day, has used Tylenol and Motrin normal...

## 2018-12-22 NOTE — ED PROVIDER NOTE - OBJECTIVE STATEMENT
85 YOM pmh MI, CAD, p/w dizziness with syncope vs seizure. Pt states he was outside in his yard when he felt dizzy with everything spinning around him. Pt then went to his sofa and does not recall exactly what happened. Pt woke up in a puddle of his urine. No hx of seizures, no prior episodes of syncope. Pt denies fever, chills. Pt states he feels unsteady on his feet and has not felt like his normal self all day today. Pt denies any chest pain or SOB. denies abd pain, denies dark stools. 85 YOM pmh MI, CAD, p/w dizziness with syncope vs seizure. Pt states he was outside in his yard when he felt dizzy with everything spinning around him. Pt then went to his sofa and does not recall exactly what happened. Pt woke up in a puddle of his urine. No hx of seizures, no prior episodes of syncope. Pt denies fever, chills. Pt states he feels unsteady on his feet and has not felt like his normal self all day today. Pt denies any chest pain or SOB. denies abd pain, denies dark stools.    Attending note (Ayaan): 81 y/o M with h/o CAD, HTN, HLD, Hypothyroidism, presenting to the ED for evaluation after apparent episode of loss of consciousness today.  Patient states he was outside in his yard when he began feeling dizzy (spinning sensation) and he came inside.  Sat down on his couch, and then is not sure what happened. Wife reportedly found him sitting with head back and unconscious on couch, having urinated on himself.  Patient was awoken by wife, rapidly regained consciousness with no apparent confusion, but was initially somewhat slow to answer questions.  No fever/chills, no nausea/vomiting, no palpitations, no chest pain, no shortness of breath.

## 2018-12-22 NOTE — ED PROCEDURE NOTE - PROCEDURE ADDITIONAL DETAILS
POCUS: Emergency Department Focused Ultrasound performed at patient's bedside.  The complete report will be available in PACS.  urinary retention

## 2018-12-22 NOTE — ED ADULT TRIAGE NOTE - CHIEF COMPLAINT QUOTE
Patient presents with an episode of dizziness while taking the trash out today. Patient states that he did not pass out and laid down. Patient states that he felt better after laying down.

## 2018-12-22 NOTE — H&P ADULT - NSHPPHYSICALEXAM_GEN_ALL_CORE
.  VITAL SIGNS:  T(C): 36.8 (12-22-18 @ 19:20), Max: 36.8 (12-22-18 @ 17:54)  T(F): 98.2 (12-22-18 @ 19:20), Max: 98.3 (12-22-18 @ 17:54)  HR: 67 (12-22-18 @ 19:20) (63 - 67)  BP: 120/68 (12-22-18 @ 19:20) (117/65 - 153/80)  BP(mean): --  RR: 15 (12-22-18 @ 19:20) (15 - 18)  SpO2: 95% (12-22-18 @ 19:20) (95% - 99%)  Wt(kg): --    PHYSICAL EXAM:    Constitutional: WDWN resting comfortably in bed; NAD  Head: NC/AT  Eyes: PERRL, EOMI, anicteric sclera  ENT: no nasal discharge; uvula midline, no oropharyngeal erythema or exudates; MMM  Neck: supple; no JVD  Respiratory: CTA B/L; no W/R/R, no retractions  Cardiac: +S1/S2; RRR; no murmur  Gastrointestinal: soft, NT/ND; no rebound or guarding; +BSx4  Back: no bony tenderness or step-offs; no CVAT B/L  Extremities: no clubbing or cyanosis; no peripheral edema  Musculoskeletal: NROM x4; no joint swelling, tenderness or erythema  Vascular: 2+ radial, femoral, DP/PT pulses B/L  Dermatologic: skin warm, dry and intact; no rashes, wounds, or scars  Lymphatic: no submandibular or cervical LAD  Neurologic: AAOx3; CNII-XII grossly intact; no focal deficits  Psychiatric: affect and characteristics of appearance, verbalizations, behaviors are appropriate

## 2018-12-22 NOTE — ED PROVIDER NOTE - ATTENDING CONTRIBUTION TO CARE
79 y/o M with h/o CAD, HTN, HLD, Hypothyroidism, presenting to the ED for evaluation after apparent episode of loss of consciousness today.  Patient states he was outside in his yard when he began feeling dizzy (spinning sensation) and he came inside.  Sat down on his couch, and then is not sure what happened. Wife reportedly found him sitting with head back and unconscious on couch, having urinated on himself.  Patient was awoken by wife, rapidly regained consciousness with no apparent confusion, but was initially somewhat slow to answer questions.  No fever/chills, no nausea/vomiting, no palpitations, no chest pain, no shortness of breath.    On Physical Exam:  General: well appearing, in NAD, speaking clearly in full sentences and without difficulty; cooperative with exam  HEENT: PERRL, MMM  Neck: no neck tenderness, no nuchal rigidity  Cardiac: normal s1, s2; RRR; no MGR  Lungs: CTABL  Abdomen: soft nontender/nondistended  : no bladder tenderness or distension  Skin: intact, no rash  Extremities: no peripheral edema, no gross deformities  Neuro: no gross neurologic deficits     AP: Presentation concerning for seizure vs syncope.  Plan: ct head, ecg, labs including troponin; UA, cxr.  Likely to require admission for further evaluation.

## 2018-12-22 NOTE — H&P ADULT - PROBLEM SELECTOR PLAN 3
Reported 30 lb weight loss in 2 months. Partly intentional, patient wanted to lose weight, but also reports decreased appetite and more significant weight loss than intended. Non-smoker, patient believes that most recent colonoscopy (~8-9 years ago) and PSA are appropriate. May be related to recent stressors of his wife having chronic illness being the primary care-taker.  - Recommend further age-appropriate screening outpatient.  - Consider SW consult in AM. Reported 30 lb weight loss in 2 months. Partly intentional, patient wanted to lose weight, but also reports decreased appetite and more significant weight loss than intended. Non-smoker, patient believes that most recent colonoscopy (~8-9 years ago) and PSA are appropriate. May be related to recent stressors of his wife having chronic illness being the primary care-taker.  - Recommend further age-appropriate screening outpatient.  - Can consider CT A/P  - Consider SW consult in AM.

## 2018-12-22 NOTE — H&P ADULT - PROBLEM SELECTOR PLAN 2
Mild ELLIS with Cr 1.41 (baseline from Jan 1.09) and BUN/Cr ratio of 19.15. In the setting of recent weight loss and an episode of pre-syncope. S/p 1L IV NS in ED.   - Follow up Cr in AM  - Holding home Valsartan  - If Cr does not improve in AM, consider urine electrolytes, US renal Mild ELLIS with Cr 1.41 (baseline from Jan 1.09) and BUN/Cr ratio of 19.15. In the setting of recent weight loss and an episode of pre-syncope. S/p 1L IV NS in ED.   - Follow up Cr in AM  - Holding home Valsartan  - If Cr does not improve in AM, consider urine electrolytes, US renal  - Bladder scan

## 2018-12-22 NOTE — H&P ADULT - PMH
BPH (Benign Prostatic Hypertrophy)    CAD (coronary artery disease)    High Cholesterol    HTN (Hypertension)    Hypothyroid    MI (Myocardial Infarction)    Type 2 diabetes mellitus without complication, without long-term current use of insulin

## 2018-12-22 NOTE — H&P ADULT - ASSESSMENT
85M PMH CAD (MI in 1993 s/p balloon angioplasty, repeat balloon angioplasty in 1995), BPH, HTN, DM2, hypothyroidism, chronic intermittent prednisone use (prescribed by a Rheumatologist, for intermittent muscle pain bilateral arms, pt unclear on diagnosis), who presents from home s/p episode of pre-syncope and dysequilibrium associated with an episode of urinary incontinence. Patient's presentation also significant for recent weight loss of 30 lbs over the past 2 months and decreased appetite in the setting of home stressors 85M PMH CAD (MI in 1993 s/p balloon angioplasty, repeat balloon angioplasty in 1995), BPH, HTN, DM2, hypothyroidism, chronic intermittent prednisone use (prescribed by a Rheumatologist, for intermittent muscle pain bilateral arms, pt unclear on diagnosis), who presents from home s/p episode of pre-syncope and loss of balance associated with an episode of urinary incontinence. Patient's presentation also significant for recent weight loss of 30 lbs over the past 2 months and decreased appetite in the setting of home stressors 85M PMH CAD (MI in 1993 s/p balloon angioplasty, repeat balloon angioplasty in 1995), BPH, HTN, DM2, hypothyroidism, chronic intermittent prednisone use (prescribed by a Rheumatologist, for intermittent muscle pain bilateral arms, pt unclear on diagnosis), who presents from home s/p episode of pre-syncope and loss of balance associated with an episode of urinary incontinence. Patient's presentation also significant for recent weight loss of 30 lbs over the past 2 months and decreased appetite in the setting of home stressors.

## 2018-12-22 NOTE — H&P ADULT - HISTORY OF PRESENT ILLNESS
85M PMH CAD (MI in 1993 s/p balloon angioplasty, repeat balloon angioplasty in 1995), BPH, HTN, DM2, hypothyroidism, chronic intermittent prednisone use (intermittent muscle pain bilateral arms, unclear diagnosis), 85M PMH CAD (MI in 1993 s/p balloon angioplasty, repeat balloon angioplasty in 1995), BPH, HTN, DM2, hypothyroidism, chronic intermittent prednisone use (prescribed by a Rheumatologist, for intermittent muscle pain bilateral arms, pt unclear on diagnosis), who presents s/p episode of pre-syncope. Patient states that he went to take the garbage outside, and when he was walking back inside he began to feel light-headed and wobbly. He went inside and sat on his chair, where he continued to feel light-headed and weak for a few minutes, and endorses a few seconds of nausea when he sat down. When his wife came to him he realized that he was wet and had urinated on himself, which he had been unaware. Denies sensation of room spinning but rather light-headed and loss of balance. He does not believe he lost consciousness, otherwise denies any symptoms of confusion or disorientation. Grandson came by 20 minutes later and he was at his baseline. Denies postictal symptoms, tongue biting, LOC, convulsions, foaming at mouth, chest pain, palpitations, LE swelling.   He reports having similar symptoms occur twice in the past, he believes it was 2006 and 2009. He is not sure what the cause was, maybe thought to be a seizure.  Patient does endorse significant recent weight loss, ~30 lbs over the past 2 months. He does state he was trying to lose some weight but this is more than he intended and has not had an appetite. States that his last colonoscopy was ~ 8-9 years ago and believes there were no abnormalities. He follows with a urologist and has had his PSA tested which he believes is unremarkable. No history of tobacco use.  At the end of the interview patient's Grandson conveys privately that he feels his grandfather has been under significant stress recently because he is taking care of his wife who is sick. Feels that this may be playing a role in patient's symptoms and weight loss.    In the ED, patient had normal vitals w/mild hypertension, mild leukocytosis 12.2 (77.4% neutrophils), mild elevation in Creatinine (1.41) compared to prior value in 1/2018 (Cr 1.09). Patient had urinalysis w/o signs of infection, hsTrop T 43, CXR that appeared clear, and CTH wnl. Patient received 1L IV LR.

## 2018-12-22 NOTE — ED ADULT NURSE NOTE - OBJECTIVE STATEMENT
86 y/o male presenting to the ED via walking in complaining of episode of dizziness today. Patient states was walking to take garbage out when he felt "like the room was spinning" and sat down. Patient then proc 86 y/o male presenting to the ED via walking in complaining of episode of dizziness today. Patient states was walking to take garbage out when he felt "like the room was spinning" and sat down. Patient then proceeded to go home and sit on couch when he had a period of incontinence. Patient states did not even feel himself urinating. On arrival patient awake and oriented x 3. Patient denies any difficulty breathing, SOB, n/v/d, numbness, tingling, fevers, headache. Unsteady gait noted upon ambulation. Equal bilateral strength and sensation noted to upper and lower extremities. EKG completed on arrival. . Safety and comfort measures provided.

## 2018-12-22 NOTE — H&P ADULT - NSHPLABSRESULTS_GEN_ALL_CORE
All labs personally reviewed:                           13.2   12.2  )-----------( 202      ( 22 Dec 2018 16:34 )             38.3     12-22    139  |  101  |  27<H>  ----------------------------<  191<H>  5.0   |  23  |  1.41<H>    Ca    9.5      22 Dec 2018 16:34  Mg     1.6     12-22    TPro  7.0  /  Alb  4.4  /  TBili  0.9  /  DBili  x   /  AST  21  /  ALT  13  /  AlkPhos  61  12-22    Troponin T, High Sensitivity Result: 43: Rapid upward or downward changes in high-sensitivity troponin levels  suggest acute myocardial injury. Renal impairment may cause sustained  troponin elevations.  Normal: <6 - 14 ng/L  Indeterminate: 15-51 ng/L  Elevated: > 51 ng/L  See [email or web address]/test/[email or web address] on the [employer] intranet for more  information ng/L (12.22.18 @ 16:34)      All imaging personally reviewed:    < from: Xray Chest 1 View- PORTABLE-Urgent (12.22.18 @ 17:40) >    FINDINGS:    Normal heart size.  The lungs are clear.  No pleural effusions or pneumothorax.  No acute osseous findings.    IMPRESSION:     Clear lungs.    < end of copied text >      < from: CT Head No Cont (12.22.18 @ 16:55) >    FINDINGS:  No acute hemorrhage or infarct is identified. No   hydrocephalus, midline shift or extra-axial collections are present.   Age-appropriate involutional changes and mild microvascular ischemic   changes are present.    The orbits are not remarkable in appearance.    The visualized paranasal sinuses and tympanomastoid cavities are free of   acute disease.    Impression:    No acute hemorrhage or infarct.    < end of copied text >      EKG Personally reviewed:  NSR 64 bpm, grossly normal w/o T-wave abnormalities

## 2018-12-22 NOTE — ED PROVIDER NOTE - NEUROLOGICAL, MLM
Alert and oriented, no focal deficits, no motor or sensory deficits. Cerebellar function test intact, pt ambulatory

## 2018-12-22 NOTE — H&P ADULT - ATTENDING COMMENTS
Patient with most likely syncopal episode, has also had significant weight loss. Patient reports significant stressor in wife being ill and dealing with episodes of ELLIS on CKD as well as her becoming less functional/confused than she used to be. Per family his wife was primary caretaker for him. On further questioning patient has been struggling to be able to feed himself due to his wife's illness and has not been eating nutritious food, sometimes just eating bread he attempts to go out to buy. Weight loss of 30 pounds over 2-3 month period. No hair loss. No insomnia. TSH WNL. Has been sleeping OK but does report feelings of sadness but thinks these are mild. Has still been somewhat outgoing. Has grandchildren in area. Daughter lives in [place] but given his hospitalization she is flying in to help take care of him for the next 2 weeks.    However did have incontinence with LOC. Would benefit from EEG to ensure this was not seizure, though suspect that patient's significant deconditioning/protein-calorie malnutrition most likely contributed to this episode. May benefit from setup of home aide to help patient and wife. Discussed with patient and grandson that patient should also have routine cancer screenings to ensure that weight loss is not related to malignancy. Offered patient antidepressants but patient and family would like to defer for now and see if his weight and overall condition improves with help of his daughter and possibility of obtaining a home aide.

## 2018-12-22 NOTE — H&P ADULT - PROBLEM SELECTOR PLAN 4
No localizing symptoms of infection, negative UA and CXR. No fevers and stable vitals. Likely secondary to chronic steroid use.  - Trend CBC and monitor for signs/symptoms of infection  - C/w home steroids, monitor BP and FS  - Patient reports intermittently using. Will check AM cortisol. No localizing symptoms of infection, negative UA and CXR. No fevers and stable vitals. Likely secondary to chronic steroid use.  - Trend CBC and monitor for signs/symptoms of infection  - Will check BCx on AM labs to r/o infection  - C/w home steroids, monitor BP and FS  - Patient reports intermittently using. Will check AM cortisol.

## 2018-12-22 NOTE — H&P ADULT - NSHPREVIEWOFSYSTEMS_GEN_ALL_CORE
Review of Systems:   CONSTITUTIONAL: No fever, +appetite changes, + weight changes  EYES: No eye pain, visual disturbances, or discharge  ENMT:  No difficulty hearing, tinnitus, vertigo; No sinus or throat pain  NECK: No pain or stiffness  RESPIRATORY: No cough, wheezing, chills or hemoptysis; No shortness of breath  CARDIOVASCULAR: No chest pain, palpitations, or leg swelling. + light-headedness  GASTROINTESTINAL: No abdominal or epigastric pain. No nausea, vomiting, or hematemesis; No diarrhea or constipation. No melena or hematochezia.  GENITOURINARY: No dysuria, frequency, hematuria, or incontinence  NEUROLOGICAL: No headaches, memory loss, loss of strength, numbness, or tremors  SKIN: No itching, burning, rashes, or lesions   LYMPH NODES: No enlarged glands  ENDOCRINE: No heat or cold intolerance; No hair loss  MUSCULOSKELETAL: No joint pain or swelling; No muscle, back, or extremity pain  PSYCHIATRIC: No anxiety, mood swings, or difficulty sleeping  HEME/LYMPH: No easy bruising, or bleeding gums  ALLERY AND IMMUNOLOGIC: No hives or eczema

## 2018-12-22 NOTE — ED ADULT NURSE REASSESSMENT NOTE - NS ED NURSE REASSESS COMMENT FT1
Report received from Marisela BARRERA, VS repeated. Patient resting comfortably, denies chest pain/SOB/pain. Repeat troponin drawn and sent to lab, pt admitted on telemetry monitor awaiting admit bed

## 2018-12-22 NOTE — ED PROVIDER NOTE - NS ED ROS FT
CONSTITUTIONAL: No fevers, no chills  Eyes: no visual changes  Ears: no ear drainage, no ear pain  Nose: no nasal congestion  Mouth/Throat: no sore throat  Cardiovascular: No Chest pain  Respiratory: No SOB  Gastrointestinal: No n/v/d, no abd pain  Genitourinary: no dysuria, no hematuria, +urinary incontinence  SKIN: no rashes.  NEURO: +syncope CONSTITUTIONAL: No fevers, no chills  Eyes: no visual changes  Ears: no ear drainage, no ear pain  Nose: no nasal congestion  Mouth/Throat: no sore throat  Cardiovascular: No Chest pain  Respiratory: No SOB  Gastrointestinal: No n/v/d, no abd pain  Genitourinary: no dysuria, no hematuria, +urinary incontinence  SKIN: no rashes.  NEURO: +syncope    Attending note (Ayaan): my review of systems is documented below:

## 2018-12-22 NOTE — H&P ADULT - PROBLEM SELECTOR PLAN 1
Episode of light-headedness and loss of balance requiring patient to sit, associated with an episode of incontinence of which he was unaware but patient otherwise denying symptoms of seizure, post-ictal state, cardiac symptoms, or vertigo. Given ELLIS and recent weight loss with poor PO, suspect possible orthostatic cause. Repeat cardiac enzymes downtrended, EKG wnl. CTH negative.  - Follow-up orthostatics, although patient now s/p 1L IV NS  - monitor on tele  - Consider EEG in AM  - F/u AM cortisol as patient has been taking this intermittently.   - Consider PT eval Episode of light-headedness and loss of balance requiring patient to sit, associated with an episode of incontinence of which he was unaware but patient otherwise denying symptoms of seizure, post-ictal state, cardiac symptoms, or vertigo. Given ELLIS and recent weight loss with poor PO, suspect possible orthostatic cause. Repeat cardiac enzymes downtrended, EKG wnl. CTH negative.  - Follow-up orthostatics, although patient now s/p 1L IV NS  - monitor on tele  - Consider EEG in AM  - F/u AM cortisol as patient has been taking this intermittently.   - Consider PT eval  - If symptoms persist or worsen, given episode of incontinence and abnormal gait, would consider work-up for NPH

## 2018-12-23 LAB
ALBUMIN SERPL ELPH-MCNC: 3.7 G/DL — SIGNIFICANT CHANGE UP (ref 3.3–5)
ALP SERPL-CCNC: 51 U/L — SIGNIFICANT CHANGE UP (ref 40–120)
ALT FLD-CCNC: 9 U/L — LOW (ref 10–45)
ANION GAP SERPL CALC-SCNC: 11 MMOL/L — SIGNIFICANT CHANGE UP (ref 5–17)
AST SERPL-CCNC: 12 U/L — SIGNIFICANT CHANGE UP (ref 10–40)
BASOPHILS # BLD AUTO: 0.03 K/UL — SIGNIFICANT CHANGE UP (ref 0–0.2)
BASOPHILS NFR BLD AUTO: 0.3 % — SIGNIFICANT CHANGE UP (ref 0–2)
BILIRUB SERPL-MCNC: 0.6 MG/DL — SIGNIFICANT CHANGE UP (ref 0.2–1.2)
BUN SERPL-MCNC: 20 MG/DL — SIGNIFICANT CHANGE UP (ref 7–23)
CALCIUM SERPL-MCNC: 9.1 MG/DL — SIGNIFICANT CHANGE UP (ref 8.4–10.5)
CHLORIDE SERPL-SCNC: 106 MMOL/L — SIGNIFICANT CHANGE UP (ref 96–108)
CO2 SERPL-SCNC: 24 MMOL/L — SIGNIFICANT CHANGE UP (ref 22–31)
CORTIS AM PEAK SERPL-MCNC: 6 UG/DL — SIGNIFICANT CHANGE UP (ref 6–18.4)
CREAT SERPL-MCNC: 1.2 MG/DL — SIGNIFICANT CHANGE UP (ref 0.5–1.3)
EOSINOPHIL # BLD AUTO: 0.18 K/UL — SIGNIFICANT CHANGE UP (ref 0–0.5)
EOSINOPHIL NFR BLD AUTO: 1.5 % — SIGNIFICANT CHANGE UP (ref 0–6)
GLUCOSE BLDC GLUCOMTR-MCNC: 119 MG/DL — HIGH (ref 70–99)
GLUCOSE BLDC GLUCOMTR-MCNC: 125 MG/DL — HIGH (ref 70–99)
GLUCOSE BLDC GLUCOMTR-MCNC: 132 MG/DL — HIGH (ref 70–99)
GLUCOSE BLDC GLUCOMTR-MCNC: 135 MG/DL — HIGH (ref 70–99)
GLUCOSE BLDC GLUCOMTR-MCNC: 139 MG/DL — HIGH (ref 70–99)
GLUCOSE SERPL-MCNC: 115 MG/DL — HIGH (ref 70–99)
HBA1C BLD-MCNC: 6.5 % — HIGH (ref 4–5.6)
HCT VFR BLD CALC: 33 % — LOW (ref 39–50)
HGB BLD-MCNC: 11.1 G/DL — LOW (ref 13–17)
IMM GRANULOCYTES NFR BLD AUTO: 0.3 % — SIGNIFICANT CHANGE UP (ref 0–1.5)
INR BLD: 1.24 RATIO — HIGH (ref 0.88–1.16)
LYMPHOCYTES # BLD AUTO: 3.66 K/UL — HIGH (ref 1–3.3)
LYMPHOCYTES # BLD AUTO: 30.6 % — SIGNIFICANT CHANGE UP (ref 13–44)
MAGNESIUM SERPL-MCNC: 1.8 MG/DL — SIGNIFICANT CHANGE UP (ref 1.6–2.6)
MCHC RBC-ENTMCNC: 32 PG — SIGNIFICANT CHANGE UP (ref 27–34)
MCHC RBC-ENTMCNC: 33.6 GM/DL — SIGNIFICANT CHANGE UP (ref 32–36)
MCV RBC AUTO: 95.1 FL — SIGNIFICANT CHANGE UP (ref 80–100)
MONOCYTES # BLD AUTO: 0.84 K/UL — SIGNIFICANT CHANGE UP (ref 0–0.9)
MONOCYTES NFR BLD AUTO: 7 % — SIGNIFICANT CHANGE UP (ref 2–14)
NEUTROPHILS # BLD AUTO: 7.21 K/UL — SIGNIFICANT CHANGE UP (ref 1.8–7.4)
NEUTROPHILS NFR BLD AUTO: 60.3 % — SIGNIFICANT CHANGE UP (ref 43–77)
PHOSPHATE SERPL-MCNC: 2.7 MG/DL — SIGNIFICANT CHANGE UP (ref 2.5–4.5)
PLATELET # BLD AUTO: 202 K/UL — SIGNIFICANT CHANGE UP (ref 150–400)
POTASSIUM SERPL-MCNC: 4.1 MMOL/L — SIGNIFICANT CHANGE UP (ref 3.5–5.3)
POTASSIUM SERPL-SCNC: 4.1 MMOL/L — SIGNIFICANT CHANGE UP (ref 3.5–5.3)
PROT SERPL-MCNC: 5.9 G/DL — LOW (ref 6–8.3)
PROTHROM AB SERPL-ACNC: 14 SEC — HIGH (ref 10–13.1)
RBC # BLD: 3.47 M/UL — LOW (ref 4.2–5.8)
RBC # FLD: 13.5 % — SIGNIFICANT CHANGE UP (ref 10.3–14.5)
SODIUM SERPL-SCNC: 141 MMOL/L — SIGNIFICANT CHANGE UP (ref 135–145)
TSH SERPL-MCNC: 1.22 UIU/ML — SIGNIFICANT CHANGE UP (ref 0.27–4.2)
WBC # BLD: 11.96 K/UL — HIGH (ref 3.8–10.5)
WBC # FLD AUTO: 11.96 K/UL — HIGH (ref 3.8–10.5)

## 2018-12-23 PROCEDURE — 99233 SBSQ HOSP IP/OBS HIGH 50: CPT

## 2018-12-23 RX ORDER — LANOLIN ALCOHOL/MO/W.PET/CERES
1 CREAM (GRAM) TOPICAL ONCE
Qty: 0 | Refills: 0 | Status: COMPLETED | OUTPATIENT
Start: 2018-12-23 | End: 2018-12-23

## 2018-12-23 RX ADMIN — Medication 81 MILLIGRAM(S): at 09:06

## 2018-12-23 RX ADMIN — TAMSULOSIN HYDROCHLORIDE 0.8 MILLIGRAM(S): 0.4 CAPSULE ORAL at 00:13

## 2018-12-23 RX ADMIN — HEPARIN SODIUM 5000 UNIT(S): 5000 INJECTION INTRAVENOUS; SUBCUTANEOUS at 22:25

## 2018-12-23 RX ADMIN — TAMSULOSIN HYDROCHLORIDE 0.8 MILLIGRAM(S): 0.4 CAPSULE ORAL at 22:26

## 2018-12-23 RX ADMIN — Medication 2 SPRAY(S): at 09:17

## 2018-12-23 RX ADMIN — ATENOLOL 25 MILLIGRAM(S): 25 TABLET ORAL at 06:21

## 2018-12-23 RX ADMIN — Medication 1 MILLIGRAM(S): at 22:26

## 2018-12-23 RX ADMIN — HEPARIN SODIUM 5000 UNIT(S): 5000 INJECTION INTRAVENOUS; SUBCUTANEOUS at 06:21

## 2018-12-23 RX ADMIN — HEPARIN SODIUM 5000 UNIT(S): 5000 INJECTION INTRAVENOUS; SUBCUTANEOUS at 13:29

## 2018-12-23 RX ADMIN — Medication 5 MILLIGRAM(S): at 06:21

## 2018-12-23 RX ADMIN — ATORVASTATIN CALCIUM 10 MILLIGRAM(S): 80 TABLET, FILM COATED ORAL at 22:25

## 2018-12-23 RX ADMIN — FINASTERIDE 5 MILLIGRAM(S): 5 TABLET, FILM COATED ORAL at 09:06

## 2018-12-23 RX ADMIN — AMLODIPINE BESYLATE 10 MILLIGRAM(S): 2.5 TABLET ORAL at 06:21

## 2018-12-23 RX ADMIN — Medication 125 MICROGRAM(S): at 06:20

## 2018-12-23 RX ADMIN — HEPARIN SODIUM 5000 UNIT(S): 5000 INJECTION INTRAVENOUS; SUBCUTANEOUS at 00:14

## 2018-12-23 RX ADMIN — ATORVASTATIN CALCIUM 10 MILLIGRAM(S): 80 TABLET, FILM COATED ORAL at 00:14

## 2018-12-23 NOTE — PROGRESS NOTE ADULT - SUBJECTIVE AND OBJECTIVE BOX
Patient is a 85y old  Male who presents with a chief complaint of Pre-syncope (22 Dec 2018 20:25)      SUBJECTIVE / OVERNIGHT EVENTS:    no overnight events. pt is feeling well. denies cp, sob, dizziness, lightheadedness. orthostatics checked and negative.   unclear if pt had seizure when event happened. he denies prior hx of suspicion for seizure.   however he does note recent stress, and likely dehydration    MEDICATIONS  (STANDING):  amLODIPine   Tablet 10 milliGRAM(s) Oral daily  aspirin enteric coated 81 milliGRAM(s) Oral daily  ATENolol  Tablet 25 milliGRAM(s) Oral daily  atorvastatin 10 milliGRAM(s) Oral at bedtime  dextrose 5%. 1000 milliLiter(s) (50 mL/Hr) IV Continuous <Continuous>  dextrose 50% Injectable 12.5 Gram(s) IV Push once  dextrose 50% Injectable 25 Gram(s) IV Push once  dextrose 50% Injectable 25 Gram(s) IV Push once  finasteride 5 milliGRAM(s) Oral daily  fluticasone propionate 50 MICROgram(s)/spray Nasal Spray 2 Spray(s) Both Nostrils daily  heparin  Injectable 5000 Unit(s) SubCutaneous every 8 hours  insulin lispro (HumaLOG) corrective regimen sliding scale   SubCutaneous three times a day before meals  insulin lispro (HumaLOG) corrective regimen sliding scale   SubCutaneous at bedtime  levothyroxine 125 MICROGram(s) Oral daily  predniSONE   Tablet 5 milliGRAM(s) Oral daily  tamsulosin 0.8 milliGRAM(s) Oral at bedtime    MEDICATIONS  (PRN):  dextrose 40% Gel 15 Gram(s) Oral once PRN Blood Glucose LESS THAN 70 milliGRAM(s)/deciliter  famotidine    Tablet 20 milliGRAM(s) Oral at bedtime PRN GERD  glucagon  Injectable 1 milliGRAM(s) IntraMuscular once PRN Glucose LESS THAN 70 milligrams/deciliter        CAPILLARY BLOOD GLUCOSE      POCT Blood Glucose.: 135 mg/dL (23 Dec 2018 13:19)  POCT Blood Glucose.: 125 mg/dL (23 Dec 2018 08:59)  POCT Blood Glucose.: 132 mg/dL (23 Dec 2018 00:12)  POCT Blood Glucose.: 173 mg/dL (22 Dec 2018 15:58)    I&O's Summary    22 Dec 2018 07:01  -  23 Dec 2018 07:00  --------------------------------------------------------  IN: 0 mL / OUT: 300 mL / NET: -300 mL      T 98.1, P 57, /66, R 17, O2 97% RA  PHYSICAL EXAM:  GENERAL: NAD, well-developed  HEAD:  Atraumatic, Normocephalic  EYES: EOMI, PERRLA, conjunctiva and sclera clear  NECK: Supple, No JVD  CHEST/LUNG: Clear to auscultation bilaterally; No wheeze  HEART: Regular rate and rhythm; No murmurs, rubs, or gallops  ABDOMEN: Soft, Nontender, Nondistended; Bowel sounds present  EXTREMITIES:  2+ Peripheral Pulses, No clubbing, cyanosis, or edema  PSYCH: AAOx3  NEUROLOGY: non-focal  SKIN: No rashes or lesions    LABS:                        11.1   11.96 )-----------( 202      ( 23 Dec 2018 08:06 )             33.0     12    141  |  106  |  20  ----------------------------<  115<H>  4.1   |  24  |  1.20    Ca    9.1      23 Dec 2018 07:05  Phos  2.7     12  Mg     1.8         TPro  5.9<L>  /  Alb  3.7  /  TBili  0.6  /  DBili  x   /  AST  12  /  ALT  9<L>  /  AlkPhos  51  12    PT/INR - ( 23 Dec 2018 08:17 )   PT: 14.0 sec;   INR: 1.24 ratio               Urinalysis Basic - ( 22 Dec 2018 17:44 )    Color: Yellow / Appearance: Clear / S.021 / pH: x  Gluc: x / Ketone: Negative  / Bili: Negative / Urobili: Negative   Blood: x / Protein: Trace / Nitrite: Negative   Leuk Esterase: Negative / RBC: 2 /hpf / WBC 1 /hpf   Sq Epi: x / Non Sq Epi: 1 /hpf / Bacteria: Negative        RADIOLOGY & ADDITIONAL TESTS:    Imaging Personally Reviewed:    Consultant(s) Notes Reviewed:      Care Discussed with Consultants/Other Providers: medicine NP

## 2018-12-23 NOTE — PROGRESS NOTE ADULT - PROBLEM SELECTOR PLAN 3
Reported 30 lb weight loss in 2 months. Partly intentional, patient wanted to lose weight, but also reports decreased appetite and more significant weight loss than intended. Non-smoker, patient believes that most recent colonoscopy (~8-9 years ago) and PSA are appropriate. May be related to recent stressors of his wife having chronic illness being the primary care-taker.  - Recommend further age-appropriate screening outpatient. discussed with pt  -nutrition consult

## 2018-12-23 NOTE — CONSULT NOTE ADULT - SUBJECTIVE AND OBJECTIVE BOX
Requesting Physician : Dr. Vernon     Reason for Consultation: CAD    HISTORY OF PRESENT ILLNESS:  85 year old male with a past medical history of hypertension, hypercholesterolemia, coronary artery disease with prior MI [id number], status post balloon angioplasty in 1999, hypothyroidism admitted with near syncope.  The patient was walking back from taking the garbage out when all of a sudden he felt lightheaded.  He sat down and his wife had noticed that he had lost continence.  He denies LOC or shaking during the episode.  He was admitted where workup has thus far included ECG with narrow QRS and CTH showing no acute infarct.  EEG pending.  The patient has no other cardiac complaints such as chest pain, sob, orthopnea, LE edema.  Last TTE with Dr. Vernon in April of 2018 showed normal LV function.        PAST MEDICAL & SURGICAL HISTORY:  Type 2 diabetes mellitus without complication, without long-term current use of insulin  CAD (coronary artery disease)  Hypothyroid  BPH (Benign Prostatic Hypertrophy)  MI (Myocardial Infarction)  HTN (Hypertension)  High Cholesterol  S/P total knee replacement: Bilateral, right knee 7/2011 and left knee 10/2011  History of coronary angioplasty: with balloon angioplasty, no stent in 1993 and 1995          MEDICATIONS:  MEDICATIONS  (STANDING):  amLODIPine   Tablet 10 milliGRAM(s) Oral daily  aspirin enteric coated 81 milliGRAM(s) Oral daily  ATENolol  Tablet 25 milliGRAM(s) Oral daily  atorvastatin 10 milliGRAM(s) Oral at bedtime  dextrose 5%. 1000 milliLiter(s) (50 mL/Hr) IV Continuous <Continuous>  dextrose 50% Injectable 12.5 Gram(s) IV Push once  dextrose 50% Injectable 25 Gram(s) IV Push once  dextrose 50% Injectable 25 Gram(s) IV Push once  finasteride 5 milliGRAM(s) Oral daily  fluticasone propionate 50 MICROgram(s)/spray Nasal Spray 2 Spray(s) Both Nostrils daily  heparin  Injectable 5000 Unit(s) SubCutaneous every 8 hours  insulin lispro (HumaLOG) corrective regimen sliding scale   SubCutaneous three times a day before meals  insulin lispro (HumaLOG) corrective regimen sliding scale   SubCutaneous at bedtime  levothyroxine 125 MICROGram(s) Oral daily  predniSONE   Tablet 5 milliGRAM(s) Oral daily  tamsulosin 0.8 milliGRAM(s) Oral at bedtime      Allergies    No Known Allergies    Intolerances        FAMILY HISTORY:  No pertinent family history in first degree relatives    Non-contributary for premature coronary disease or sudden cardiac death    SOCIAL HISTORY:    [x ] Non-smoker  [ ] Smoker  [ ] Alcohol      REVIEW OF SYSTEMS:  [ ]chest pain  [  ]shortness of breath  [  ]palpitations  [  ]syncope  [x ]near syncope [ ]upper extremity weakness   [ ] lower extremity weakness  [  ]diplopia  [  ]altered mental status   [  ]fevers  [ ]chills [ ]nausea  [ ]vomitting  [  ]dysphagia    [ ]abdominal pain  [ ]melena  [ ]BRBPR    [  ]epistaxis  [  ]rash    [ ]lower extremity edema        [x ] All others negative	  [ ] Unable to obtain    PHYSICAL EXAM:  T(C): 36.8 (12-23-18 @ 12:38), Max: 36.8 (12-22-18 @ 17:54)  HR: 60 (12-23-18 @ 12:38) (57 - 67)  BP: 134/58 (12-23-18 @ 12:38) (117/65 - 153/80)  RR: 18 (12-23-18 @ 12:38) (15 - 18)  SpO2: 96% (12-23-18 @ 12:38) (95% - 99%)  Wt(kg): --  I&O's Summary    22 Dec 2018 07:01  -  23 Dec 2018 07:00  --------------------------------------------------------  IN: 0 mL / OUT: 300 mL / NET: -300 mL          	  Lymphatic: No lymphadenopathy , no edema  Cardiovascular: Normal S1 S2, No JVD, No murmurs , Peripheral pulses palpable 2+ bilaterally  Respiratory: Lungs clear to auscultation, normal effort 	  Gastrointestinal:  Soft, Non-tender, + BS	  Skin: No rashes, No ecchymoses, No cyanosis, warm to touch  Psychiatry:  Mood & affect appropriate      TELEMETRY: SR/SB	    ECG:  SR, narrow qrs 	  RADIOLOGY:  OTHER:     DIAGNOSTIC TESTING:  [ ] Echocardiogram: < from: Transthoracic Echocardiogram w/Doppler (01.25.12 @ 09:24) >  Conclusions:  1. Normal mitral valve. Minimal mitral regurgitation.  2. Normal trileaflet aortic valve. Minimal aortic  regurgitation.  3. Normal left atrium.  LA volume index = 18 cc/m2. 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  4. Normal left ventricular internal dimensions and wall  thickness.  5. Mild segmental left ventricular systolic dysfunction. EF  45%. The distal septum is akinetic.  6. Normal right atrium.  7. Normal right ventricular size and function.  8. Mild tricuspid regurgitation. PASP 31mmHg.  9. Agitated saline injection and color flow Doppler  demonstrate no evidence of a patent foramen ovale.    < end of copied text >    [ ]  Catheterization:   [ ] Stress Test:    	  	  LABS:	 	    CARDIAC MARKERS:                              11.1   11.96 )-----------( 202      ( 23 Dec 2018 08:06 )             33.0     12-23    141  |  106  |  20  ----------------------------<  115<H>  4.1   |  24  |  1.20    Ca    9.1      23 Dec 2018 07:05  Phos  2.7     12-23  Mg     1.8     12-23    TPro  5.9<L>  /  Alb  3.7  /  TBili  0.6  /  DBili  x   /  AST  12  /  ALT  9<L>  /  AlkPhos  51  12-23    proBNP:   Lipid Profile:   HgA1c: Hemoglobin A1C, Whole Blood: 6.5 % (12-23 @ 08:06)    TSH: Thyroid Stimulating Hormone, Serum: 1.22 uIU/mL (12-22 @ 21:44)      ASSESSMENT/PLAN: 85 year old male with a past medical history of hypertension, hypercholesterolemia, coronary artery disease with prior MI [id number], status post balloon angioplasty in 1999, hypothyroidism admitted with near syncope.    -monitor on tele to rule out arrythmogenic causes of near syncope  -check TTE to assess for LV function  -continue with asa for history of cad  -follow up EEG  -further workup pending above    Mayra Benitez MD

## 2018-12-23 NOTE — PROGRESS NOTE ADULT - PROBLEM SELECTOR PLAN 2
Mild ELLIS with Cr 1.41 (baseline from Jan 1.09) and BUN/Cr ratio of 19.15. In the setting of recent weight loss and an episode of pre-syncope. S/p 1L IV NS in ED.   -improved s/p IVF. likely prerenal  - Holding home Valsartan for now

## 2018-12-23 NOTE — PROGRESS NOTE ADULT - PROBLEM SELECTOR PLAN 4
No localizing symptoms of infection, negative UA and CXR. No fevers and stable vitals. Likely secondary to chronic steroid use.  - Trend CBC and monitor for signs/symptoms of infection  - C/w home steroids, monitor BP and FS  - Patient reports intermittently using. Will check AM cortisol.

## 2018-12-23 NOTE — PROGRESS NOTE ADULT - PROBLEM SELECTOR PLAN 1
Episode of light-headedness and loss of balance requiring patient to sit, associated with an episode of incontinence of which he was unaware but patient otherwise denying symptoms of seizure, post-ictal state, cardiac symptoms, or vertigo. Given ELLIS and recent weight loss with poor PO, suspect possible orthostatic cause. Repeat cardiac enzymes downtrended, EKG wnl. CTH negative.  - orthostatics negative-- s/p 1 L IVF   - monitor on tele so far no events  - EEG pending  - PT eval Episode of light-headedness and loss of balance requiring patient to sit, associated with an episode of incontinence of which he was unaware but patient otherwise denying symptoms of seizure, post-ictal state, cardiac symptoms, or vertigo. Given ELLIS and recent weight loss with poor PO, suspect possible orthostatic cause. Repeat cardiac enzymes downtrended, EKG wnl. CTH negative.  - orthostatics negative-- s/p 1 L IVF   - monitor on tele so far no events  - EEG pending  - PT eval  -lower suspicion for cardiac etiologies- will hold off on TTE for now. pt reports normal TTE within last year. can try and obtain report.

## 2018-12-23 NOTE — PROGRESS NOTE ADULT - ASSESSMENT
85M PMH CAD (MI in 1993 s/p balloon angioplasty, repeat balloon angioplasty in 1995), BPH, HTN, DM2, hypothyroidism, chronic intermittent prednisone use (prescribed by a Rheumatologist, for intermittent muscle pain bilateral arms, pt unclear on diagnosis), who presents from home s/p episode of pre-syncope and loss of balance associated with an episode of urinary incontinence. Patient's presentation also significant for recent weight loss of 30 lbs over the past 2 months and decreased appetite in the setting of home stressors.

## 2018-12-24 ENCOUNTER — TRANSCRIPTION ENCOUNTER (OUTPATIENT)
Age: 83
End: 2018-12-24

## 2018-12-24 VITALS
HEART RATE: 57 BPM | RESPIRATION RATE: 18 BRPM | OXYGEN SATURATION: 96 % | SYSTOLIC BLOOD PRESSURE: 125 MMHG | TEMPERATURE: 98 F | DIASTOLIC BLOOD PRESSURE: 70 MMHG

## 2018-12-24 LAB
ALBUMIN SERPL ELPH-MCNC: 3.8 G/DL — SIGNIFICANT CHANGE UP (ref 3.3–5)
ALP SERPL-CCNC: 50 U/L — SIGNIFICANT CHANGE UP (ref 40–120)
ALT FLD-CCNC: 9 U/L — LOW (ref 10–45)
ANION GAP SERPL CALC-SCNC: 11 MMOL/L — SIGNIFICANT CHANGE UP (ref 5–17)
AST SERPL-CCNC: 11 U/L — SIGNIFICANT CHANGE UP (ref 10–40)
BILIRUB SERPL-MCNC: 0.6 MG/DL — SIGNIFICANT CHANGE UP (ref 0.2–1.2)
BUN SERPL-MCNC: 21 MG/DL — SIGNIFICANT CHANGE UP (ref 7–23)
CALCIUM SERPL-MCNC: 9.3 MG/DL — SIGNIFICANT CHANGE UP (ref 8.4–10.5)
CHLORIDE SERPL-SCNC: 106 MMOL/L — SIGNIFICANT CHANGE UP (ref 96–108)
CO2 SERPL-SCNC: 24 MMOL/L — SIGNIFICANT CHANGE UP (ref 22–31)
CREAT SERPL-MCNC: 1.03 MG/DL — SIGNIFICANT CHANGE UP (ref 0.5–1.3)
GLUCOSE BLDC GLUCOMTR-MCNC: 120 MG/DL — HIGH (ref 70–99)
GLUCOSE BLDC GLUCOMTR-MCNC: 140 MG/DL — HIGH (ref 70–99)
GLUCOSE BLDC GLUCOMTR-MCNC: 167 MG/DL — HIGH (ref 70–99)
GLUCOSE BLDC GLUCOMTR-MCNC: 189 MG/DL — HIGH (ref 70–99)
GLUCOSE SERPL-MCNC: 129 MG/DL — HIGH (ref 70–99)
HCT VFR BLD CALC: 34.5 % — LOW (ref 39–50)
HGB BLD-MCNC: 12.1 G/DL — LOW (ref 13–17)
MCHC RBC-ENTMCNC: 33 PG — SIGNIFICANT CHANGE UP (ref 27–34)
MCHC RBC-ENTMCNC: 35 GM/DL — SIGNIFICANT CHANGE UP (ref 32–36)
MCV RBC AUTO: 94.3 FL — SIGNIFICANT CHANGE UP (ref 80–100)
PLATELET # BLD AUTO: 183 K/UL — SIGNIFICANT CHANGE UP (ref 150–400)
POTASSIUM SERPL-MCNC: 4.6 MMOL/L — SIGNIFICANT CHANGE UP (ref 3.5–5.3)
POTASSIUM SERPL-SCNC: 4.6 MMOL/L — SIGNIFICANT CHANGE UP (ref 3.5–5.3)
PROT SERPL-MCNC: 6.2 G/DL — SIGNIFICANT CHANGE UP (ref 6–8.3)
RBC # BLD: 3.66 M/UL — LOW (ref 4.2–5.8)
RBC # FLD: 12.4 % — SIGNIFICANT CHANGE UP (ref 10.3–14.5)
SODIUM SERPL-SCNC: 141 MMOL/L — SIGNIFICANT CHANGE UP (ref 135–145)
WBC # BLD: 8.5 K/UL — SIGNIFICANT CHANGE UP (ref 3.8–10.5)
WBC # FLD AUTO: 8.5 K/UL — SIGNIFICANT CHANGE UP (ref 3.8–10.5)

## 2018-12-24 PROCEDURE — G0378: CPT

## 2018-12-24 PROCEDURE — 83735 ASSAY OF MAGNESIUM: CPT

## 2018-12-24 PROCEDURE — 94640 AIRWAY INHALATION TREATMENT: CPT

## 2018-12-24 PROCEDURE — 87040 BLOOD CULTURE FOR BACTERIA: CPT

## 2018-12-24 PROCEDURE — 80053 COMPREHEN METABOLIC PANEL: CPT

## 2018-12-24 PROCEDURE — C8929: CPT

## 2018-12-24 PROCEDURE — 99239 HOSP IP/OBS DSCHRG MGMT >30: CPT

## 2018-12-24 PROCEDURE — 82962 GLUCOSE BLOOD TEST: CPT

## 2018-12-24 PROCEDURE — 93005 ELECTROCARDIOGRAM TRACING: CPT

## 2018-12-24 PROCEDURE — 83036 HEMOGLOBIN GLYCOSYLATED A1C: CPT

## 2018-12-24 PROCEDURE — 84443 ASSAY THYROID STIM HORMONE: CPT

## 2018-12-24 PROCEDURE — 84484 ASSAY OF TROPONIN QUANT: CPT

## 2018-12-24 PROCEDURE — 70450 CT HEAD/BRAIN W/O DYE: CPT

## 2018-12-24 PROCEDURE — 84100 ASSAY OF PHOSPHORUS: CPT

## 2018-12-24 PROCEDURE — 99285 EMERGENCY DEPT VISIT HI MDM: CPT

## 2018-12-24 PROCEDURE — 95819 EEG AWAKE AND ASLEEP: CPT | Mod: 26

## 2018-12-24 PROCEDURE — 85610 PROTHROMBIN TIME: CPT

## 2018-12-24 PROCEDURE — 95819 EEG AWAKE AND ASLEEP: CPT

## 2018-12-24 PROCEDURE — 82533 TOTAL CORTISOL: CPT

## 2018-12-24 PROCEDURE — 71045 X-RAY EXAM CHEST 1 VIEW: CPT

## 2018-12-24 PROCEDURE — 85027 COMPLETE CBC AUTOMATED: CPT

## 2018-12-24 PROCEDURE — 36415 COLL VENOUS BLD VENIPUNCTURE: CPT

## 2018-12-24 PROCEDURE — 81001 URINALYSIS AUTO W/SCOPE: CPT

## 2018-12-24 PROCEDURE — 93306 TTE W/DOPPLER COMPLETE: CPT | Mod: 26

## 2018-12-24 PROCEDURE — 97161 PT EVAL LOW COMPLEX 20 MIN: CPT

## 2018-12-24 RX ORDER — AZELASTINE 137 UG/1
1 SPRAY, METERED NASAL
Qty: 0 | Refills: 0 | COMMUNITY

## 2018-12-24 RX ORDER — LOSARTAN POTASSIUM 100 MG/1
25 TABLET, FILM COATED ORAL DAILY
Qty: 0 | Refills: 0 | Status: DISCONTINUED | OUTPATIENT
Start: 2018-12-25 | End: 2018-12-24

## 2018-12-24 RX ORDER — LOSARTAN POTASSIUM 100 MG/1
1 TABLET, FILM COATED ORAL
Qty: 30 | Refills: 0 | OUTPATIENT
Start: 2018-12-24 | End: 2019-01-22

## 2018-12-24 RX ORDER — VALSARTAN 80 MG/1
1 TABLET ORAL
Qty: 0 | Refills: 0 | COMMUNITY

## 2018-12-24 RX ADMIN — AMLODIPINE BESYLATE 10 MILLIGRAM(S): 2.5 TABLET ORAL at 06:31

## 2018-12-24 RX ADMIN — ATENOLOL 25 MILLIGRAM(S): 25 TABLET ORAL at 09:38

## 2018-12-24 RX ADMIN — HEPARIN SODIUM 5000 UNIT(S): 5000 INJECTION INTRAVENOUS; SUBCUTANEOUS at 13:01

## 2018-12-24 RX ADMIN — Medication 5 MILLIGRAM(S): at 06:31

## 2018-12-24 RX ADMIN — Medication 81 MILLIGRAM(S): at 09:38

## 2018-12-24 RX ADMIN — Medication 2 SPRAY(S): at 09:37

## 2018-12-24 RX ADMIN — FINASTERIDE 5 MILLIGRAM(S): 5 TABLET, FILM COATED ORAL at 09:38

## 2018-12-24 RX ADMIN — ATORVASTATIN CALCIUM 10 MILLIGRAM(S): 80 TABLET, FILM COATED ORAL at 21:20

## 2018-12-24 RX ADMIN — TAMSULOSIN HYDROCHLORIDE 0.8 MILLIGRAM(S): 0.4 CAPSULE ORAL at 21:20

## 2018-12-24 RX ADMIN — HEPARIN SODIUM 5000 UNIT(S): 5000 INJECTION INTRAVENOUS; SUBCUTANEOUS at 21:20

## 2018-12-24 RX ADMIN — HEPARIN SODIUM 5000 UNIT(S): 5000 INJECTION INTRAVENOUS; SUBCUTANEOUS at 06:31

## 2018-12-24 RX ADMIN — Medication 1: at 13:26

## 2018-12-24 RX ADMIN — Medication 125 MICROGRAM(S): at 06:31

## 2018-12-24 NOTE — PROGRESS NOTE ADULT - SUBJECTIVE AND OBJECTIVE BOX
Subjective:  pt seen and examined, no complaints, ROS - .     no chest pain or sob on exam ,   tele : NSR 60's     amLODIPine   Tablet 10 milliGRAM(s) Oral daily  aspirin enteric coated 81 milliGRAM(s) Oral daily  ATENolol  Tablet 25 milliGRAM(s) Oral daily  atorvastatin 10 milliGRAM(s) Oral at bedtime  dextrose 40% Gel 15 Gram(s) Oral once PRN  dextrose 5%. 1000 milliLiter(s) IV Continuous <Continuous>  dextrose 50% Injectable 12.5 Gram(s) IV Push once  dextrose 50% Injectable 25 Gram(s) IV Push once  dextrose 50% Injectable 25 Gram(s) IV Push once  famotidine    Tablet 20 milliGRAM(s) Oral at bedtime PRN  finasteride 5 milliGRAM(s) Oral daily  fluticasone propionate 50 MICROgram(s)/spray Nasal Spray 2 Spray(s) Both Nostrils daily  glucagon  Injectable 1 milliGRAM(s) IntraMuscular once PRN  heparin  Injectable 5000 Unit(s) SubCutaneous every 8 hours  insulin lispro (HumaLOG) corrective regimen sliding scale   SubCutaneous three times a day before meals  insulin lispro (HumaLOG) corrective regimen sliding scale   SubCutaneous at bedtime  levothyroxine 125 MICROGram(s) Oral daily  predniSONE   Tablet 5 milliGRAM(s) Oral daily  tamsulosin 0.8 milliGRAM(s) Oral at bedtime                            11.1   11.96 )-----------( 202      ( 23 Dec 2018 08:06 )             33.0       Hemoglobin: 11.1 g/dL (12-23 @ 08:06)  Hemoglobin: 13.2 g/dL (12-22 @ 16:34)      12-23    141  |  106  |  20  ----------------------------<  115<H>  4.1   |  24  |  1.20    Ca    9.1      23 Dec 2018 07:05  Phos  2.7     12-23  Mg     1.8     12-23    TPro  5.9<L>  /  Alb  3.7  /  TBili  0.6  /  DBili  x   /  AST  12  /  ALT  9<L>  /  AlkPhos  51  12-23    Creatinine Trend: 1.20<--, 1.41<--    COAGS:           T(C): 36.6 (12-24-18 @ 04:28), Max: 36.8 (12-23-18 @ 12:38)  HR: 62 (12-24-18 @ 04:28) (57 - 62)  BP: 132/76 (12-24-18 @ 04:28) (124/66 - 134/58)  RR: 18 (12-24-18 @ 04:28) (17 - 18)  SpO2: 97% (12-24-18 @ 04:28) (96% - 97%)  Wt(kg): --    I&O's Summary    22 Dec 2018 07:01  -  23 Dec 2018 07:00  --------------------------------------------------------  IN: 0 mL / OUT: 300 mL / NET: -300 mL    23 Dec 2018 07:01  -  24 Dec 2018 05:18  --------------------------------------------------------  IN: 420 mL / OUT: 825 mL / NET: -405 mL        	  Lymphatic: No lymphadenopathy , no edema  Cardiovascular: Normal S1 S2, No JVD, No murmurs , Peripheral pulses palpable 2+ bilaterally  Respiratory: Lungs clear to auscultation, normal effort 	  Gastrointestinal:  Soft, Non-tender, + BS	  Skin: No rashes, No ecchymoses, No cyanosis, warm to touch  Psychiatry:  Mood & affect appropriate      TELEMETRY: SR/SB  	    ECG:  SR, narrow qrs 	  RADIOLOGY:  OTHER:     DIAGNOSTIC TESTING:  [ ] Echocardiogram: < from: Transthoracic Echocardiogram w/Doppler (01.25.12 @ 09:24) >  Conclusions:  1. Normal mitral valve. Minimal mitral regurgitation.  2. Normal trileaflet aortic valve. Minimal aortic  regurgitation.  3. Normal left atrium.  LA volume index = 18 cc/m2. 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  4. Normal left ventricular internal dimensions and wall  thickness.  5. Mild segmental left ventricular systolic dysfunction. EF  45%. The distal septum is akinetic.  6. Normal right atrium.  7. Normal right ventricular size and function.  8. Mild tricuspid regurgitation. PASP 31mmHg.  9. Agitated saline injection and color flow Doppler  demonstrate no evidence of a patent foramen ovale.    < end of copied text >    [ ]  Catheterization:   [ ] Stress Test:    	        ASSESSMENT/PLAN: 85 year old male with a past medical history of hypertension, hypercholesterolemia, coronary artery disease with prior MI [id number], status post balloon angioplasty in 1999, hypothyroidism admitted with near syncope.    - tele stable .   -ASA, statin  - BP stable - cont norvasc, BB  -monitor on tele to rule out arrythmogenic causes of near syncope  - Echo pending   -follow up EEG  - no s/s of chf on exam   D/W Dr Benitez

## 2018-12-24 NOTE — DISCHARGE NOTE ADULT - HOSPITAL COURSE
To be done. 85M h/o CAD (MI in 1993 s/p balloon angioplasty, repeat balloon angioplasty in 1995), BPH, HTN, DM2, hypothyroidism, chronic intermittent prednisone use (prescribed by a Rheumatologist, for intermittent muscle pain bilateral arms, pt unclear on diagnosis), who presents from home s/p episode of pre-syncope and loss of balance associated with an episode of urinary incontinence. patient had CT head negative. Initially orthostatic and Kyree that resolved with IVF hydration. HsT 43 to 37. TTE showed… Patient had EEG that showed….    Patient's presentation also significant for recent weight loss of 30 lbs over the past 2 months and decreased appetite in the setting of home stressors. Patient told to follow up as outpatient for age-appropriate screening outpatient.    Leukocytosis likely secondary to chronic steroid use. No localizing symptoms of infection, negative UA and CXR. No fevers and stable vitals. Likely secondary to chronic steroid use.    Patient to follow up with pmd, cardiology, GI and neurology outpatient. 85M h/o CAD (MI in 1993 s/p balloon angioplasty, repeat balloon angioplasty in 1995), BPH, HTN, DM2, hypothyroidism, chronic intermittent prednisone use (prescribed by a Rheumatologist, for intermittent muscle pain bilateral arms, pt unclear on diagnosis), who presents from home s/p episode of pre-syncope and loss of balance associated with an episode of urinary incontinence. patient had CT head negative. Initially orthostatic and Kyree that resolved with IVF hydration. HsT 43 to 37. TTE showed EF58% without LV thrombus.  Patient had EEG that showed no seizure like activity.    Patient's presentation also significant for recent weight loss of 30 lbs over the past 2 months and decreased appetite in the setting of home stressors. Patient told to follow up as outpatient for age-appropriate screening outpatient.    Leukocytosis likely secondary to chronic steroid use. No localizing symptoms of infection, negative UA and CXR. No fevers and stable vitals. Likely secondary to chronic steroid use.    Patient to follow up with pmd, cardiology, GI and neurology outpatient.

## 2018-12-24 NOTE — DISCHARGE NOTE ADULT - CARE PROVIDER_API CALL
Ventura Jaimes), Internal Medicine; Pulmonary Disease  [facility]  [id number] [place] [address]  Phone: [phone]  Fax: [phone]

## 2018-12-24 NOTE — DISCHARGE NOTE ADULT - PLAN OF CARE
Stable. HOME CARE INSTRUCTIONS  Have someone stay with you until you feel stable.  Do not drive, operate machinery, or play sports until your caregiver says it is okay.  Keep all follow-up appointments as directed by your caregiver.   Lie down right away if you start feeling like you might faint. Breathe deeply and steadily. Wait until all the symptoms have passed.Drink enough fluids to keep your urine clear or pale yellow.  If you are taking blood pressure or heart medicine, get up slowly, taking several minutes to sit and then stand. This can reduce dizziness.  SEEK IMMEDIATE MEDICAL CARE IF:  You have a severe headache.  You have unusual pain in the chest, abdomen, or back.  You are bleeding from the mouth or rectum, or you have black or tarry stool.  You have an irregular or very fast heartbeat.  You have pain with breathing.  You have repeated fainting or seizure-like jerking during an episode.  You faint when sitting or lying down.  You have confusion.  You have difficulty walking.  You have severe weakness.  You have vision problems.  If you fainted, call your local emergency services (_____________________). Do not drive yourself to the hospital Stable Avoid taking (NSAIDs) - (ex: Ibuprofen, Advil, Celebrex, Naprosyn)  Avoid taking any nephrotoxic agents (can harm kidneys) - Intravenous contrast for diagnostic testing, combination cold medications.  Have all medications adjusted for your renal function by your Health Care Provider.  Blood pressure control is important.  Take all medication as prescribed. Eat small frequent meals.   Follow up with PMD in 1 week. Blood sugar will continue to be well controlled. HgA1C this admission.  Make sure you get your HgA1c checked every three months.  If you take oral diabetes medications, check your blood glucose two times a day.  If you take insulin, check your blood glucose before meals and at bedtime.  It's important not to skip any meals.  Keep a log of your blood glucose results and always take it with you to your doctor appointments.  Keep a list of your current medications including injectables and over the counter medications and bring this medication list with you to all your doctor appointments.  If you have not seen your ophthalmologist this year call for appointment.  Check your feet daily for redness, sores, or openings. Do not self treat. If no improvement in two days call your primary care physician for an appointment.  Low blood sugar (hypoglycemia) is a blood sugar below 70mg/dl. Check your blood sugar if you feel signs/symptoms of hypoglycemia. If your blood sugar is below 70 take 15 grams of carbohydrates (ex 4 oz of apple juice, 3-4 glucose tablets, or 4-6 oz of regular soda) wait 15 minutes and repeat blood sugar to make sure it comes up above 70.  If your blood sugar is above 70 and you are due for a meal, have a meal.  If you are not due for a meal have a snack.  This snack helps keeps your blood sugar at a safe range. Continue present medication regimen.   Follow up in the Endocrine Clinic make an appointment. Continue to monitor re: weight loss.

## 2018-12-24 NOTE — EEG REPORT - NS EEG TEXT BOX
[facility]   COMPREHENSIVE EPILEPSY CENTER   REPORT OF ROUTINE VIDEO EEG     [facility]: [address] [place] [address] [address], [place] [address], Ph#: [phone]  [facility]: [address], [place] [address], Ph#: [phone]  Office: [address] [place] [address], [address], [place] [address] Ph#: [phone]    Patient Name: KATHI ASHLEY  Age and : 85y ([birth year])  MRN #: [id number]  Location: [address]   Referring Physician: Mellisa Sweet    Study Date: 18    _____________________________________________________________  TECHNICAL INFORMATION    Placement and Labeling of Electrodes:  The EEG was performed utilizing 20 channels referential EEG connections (coronal over temporal over parasagittal montage) using all standard 10-20 electrode placements with EKG.  Recording was at a sampling rate of 256 samples per second per channel.  Time synchronized digital video recording was done simultaneously with EEG recording.  A low light infrared camera was used for low light recording.  Omid and seizure detection algorithms were utilized.    _____________________________________________________________  HISTORY    Patient is a 85y old  Male who presents with a chief complaint of Pre-syncope (24 Dec 2018 13:49)      PERTINENT MEDICATION:  No AEDs  _____________________________________________________________  STUDY INTERPRETATION    Findings: The background was continuous, spontaneously variable and reactive. During wakefulness, the posterior dominant rhythm consisted of symmetric, well-modulated 9 Hz activity, with amplitude to 30 uV, that attenuated to eye opening.  Low amplitude frontal beta was noted in wakefulness.    Background Slowing:  No generalized background slowing was present.    Focal Slowing:   None was present.    Sleep Background:  Drowsiness was characterized by fragmentation, attenuation, and slowing of the background activity.    Stage II sleep transients were not recorded.    Other Non-Epileptiform Findings:  None were present.    Interictal Epileptiform Activity:   None were present.    Events:  Clinical events: None recorded.  Seizures: None recorded.    Activation Procedures:   Hyperventilation was not performed.    Photic stimulation was not performed.      Artifacts:  Intermittent myogenic and movement artifacts were noted.    ECG:  The heart rate on single channel ECG was predominantly between 60-70 BPM.    _____________________________________________________________  EEG SUMMARY/CLASSIFICATION  Normal EEG in the awake, drowsy states.  _____________________________________________________________  EEG IMPRESSION/CLINICAL CORRELATE  Normal EEG study.  No epileptic pattern or seizure seen.      Preliminary Fellow Read:      Puja Varner MD  Adult EEG Fellow, [facility] Epilepsy [facility]   COMPREHENSIVE EPILEPSY CENTER   REPORT OF ROUTINE VIDEO EEG     [facility]: [address] [place] [address] [address], [place] [address], Ph#: [phone]  [facility]: [address], [place] [address], Ph#: [phone]  Office: [address] [place] [address], [address], [place] [address] Ph#: [phone]    Patient Name: KATHI ASHLEY  Age and : 85y ([birth year])  MRN #: [id number]  Location: [address]   Referring Physician: Mellisa Sweet    Study Date: 18    _____________________________________________________________  TECHNICAL INFORMATION    Placement and Labeling of Electrodes:  The EEG was performed utilizing 20 channels referential EEG connections (coronal over temporal over parasagittal montage) using all standard 10-20 electrode placements with EKG.  Recording was at a sampling rate of 256 samples per second per channel.  Time synchronized digital video recording was done simultaneously with EEG recording.  A low light infrared camera was used for low light recording.  Omid and seizure detection algorithms were utilized.    _____________________________________________________________  HISTORY    Patient is a 85y old  Male who presents with a chief complaint of Pre-syncope (24 Dec 2018 13:49)      PERTINENT MEDICATION:  No AEDs  _____________________________________________________________  STUDY INTERPRETATION    Findings: The background was continuous, spontaneously variable and reactive. During wakefulness, the posterior dominant rhythm consisted of symmetric, well-modulated 9 Hz activity, with amplitude to 30 uV, that attenuated to eye opening.  Low amplitude frontal beta was noted in wakefulness.    Background Slowing:  No generalized background slowing was present.    Focal Slowing:   None was present.    Sleep Background:  Drowsiness was characterized by fragmentation, attenuation, and slowing of the background activity.    Stage II sleep transients were not recorded.    Other Non-Epileptiform Findings:  None were present.    Interictal Epileptiform Activity:   None were present.    Events:  Clinical events: None recorded.  Seizures: None recorded.    Activation Procedures:   Hyperventilation was not performed.    Photic stimulation was not performed.      Artifacts:  Intermittent myogenic and movement artifacts were noted.    ECG:  The heart rate on single channel ECG was predominantly between 60-70 BPM.    _____________________________________________________________  EEG SUMMARY/CLASSIFICATION  Normal EEG in the awake, drowsy states.  _____________________________________________________________  EEG IMPRESSION/CLINICAL CORRELATE  Normal EEG study.  No epileptic pattern or seizure seen.    Puja Varner MD  Adult EEG Fellow, [facility]    Pito Sandoval MD

## 2018-12-24 NOTE — PROGRESS NOTE ADULT - PROBLEM SELECTOR PLAN 7
- C/w Amlodipine 10 mg/d and Atenolol 25 mg/d  restart low dose losartan - patient and family asked to discontinue valsartan  - Trend BP per routine

## 2018-12-24 NOTE — PHYSICAL THERAPY INITIAL EVALUATION ADULT - PRECAUTIONS/LIMITATIONS, REHAB EVAL
CONT: PMH CAD (MI in 1993 s/p balloon angioplasty, repeat balloon angioplasty in 1995), BPH, HTN, DM2, hypothyroidism, chronic intermittent prednisone use (prescribed by a Rheumatologist, for intermittent muscle pain bilateral arms, pt unclear on diagnosis).

## 2018-12-24 NOTE — PROGRESS NOTE ADULT - SUBJECTIVE AND OBJECTIVE BOX
Patient is a 85y old  Male who presents with a chief complaint of Pre-syncope (24 Dec 2018 05:18)        SUBJECTIVE / OVERNIGHT EVENTS: no acute complaints.       MEDICATIONS  (STANDING):  amLODIPine   Tablet 10 milliGRAM(s) Oral daily  aspirin enteric coated 81 milliGRAM(s) Oral daily  ATENolol  Tablet 25 milliGRAM(s) Oral daily  atorvastatin 10 milliGRAM(s) Oral at bedtime  dextrose 5%. 1000 milliLiter(s) (50 mL/Hr) IV Continuous <Continuous>  dextrose 50% Injectable 12.5 Gram(s) IV Push once  dextrose 50% Injectable 25 Gram(s) IV Push once  dextrose 50% Injectable 25 Gram(s) IV Push once  finasteride 5 milliGRAM(s) Oral daily  fluticasone propionate 50 MICROgram(s)/spray Nasal Spray 2 Spray(s) Both Nostrils daily  heparin  Injectable 5000 Unit(s) SubCutaneous every 8 hours  insulin lispro (HumaLOG) corrective regimen sliding scale   SubCutaneous three times a day before meals  insulin lispro (HumaLOG) corrective regimen sliding scale   SubCutaneous at bedtime  levothyroxine 125 MICROGram(s) Oral daily  predniSONE   Tablet 5 milliGRAM(s) Oral daily  tamsulosin 0.8 milliGRAM(s) Oral at bedtime    MEDICATIONS  (PRN):  dextrose 40% Gel 15 Gram(s) Oral once PRN Blood Glucose LESS THAN 70 milliGRAM(s)/deciliter  famotidine    Tablet 20 milliGRAM(s) Oral at bedtime PRN GERD  glucagon  Injectable 1 milliGRAM(s) IntraMuscular once PRN Glucose LESS THAN 70 milligrams/deciliter      Vital Signs Last 24 Hrs  T(C): 36.7 (24 Dec 2018 12:11), Max: 36.7 (24 Dec 2018 12:11)  T(F): 98.1 (24 Dec 2018 12:11), Max: 98.1 (24 Dec 2018 12:11)  HR: 54 (24 Dec 2018 12:11) (54 - 69)  BP: 122/65 (24 Dec 2018 12:11) (122/65 - 134/72)  BP(mean): --  RR: 18 (24 Dec 2018 12:11) (18 - 18)  SpO2: 97% (24 Dec 2018 12:11) (96% - 97%)  CAPILLARY BLOOD GLUCOSE      POCT Blood Glucose.: 167 mg/dL (24 Dec 2018 13:20)  POCT Blood Glucose.: 140 mg/dL (24 Dec 2018 09:22)  POCT Blood Glucose.: 139 mg/dL (23 Dec 2018 22:32)  POCT Blood Glucose.: 119 mg/dL (23 Dec 2018 18:08)    I&O's Summary    23 Dec 2018 07:  -  24 Dec 2018 07:00  --------------------------------------------------------  IN: 420 mL / OUT: 825 mL / NET: -405 mL    24 Dec 2018 07:  -  24 Dec 2018 13:50  --------------------------------------------------------  IN: 240 mL / OUT: 0 mL / NET: 240 mL        PHYSICAL EXAM:  GENERAL: NAD, well-developed  HEAD:  Atraumatic, Normocephalic  EYES: EOMI, PERRLA, conjunctiva and sclera clear  NECK: Supple, No JVD  CHEST/LUNG: Clear to auscultation bilaterally; No wheeze  HEART: Regular rate and rhythm; No murmurs, rubs, or gallops  ABDOMEN: Soft, Nontender, Nondistended; Bowel sounds present  EXTREMITIES:  2+ Peripheral Pulses, No clubbing, cyanosis, or edema  PSYCH: AAOx3  NEUROLOGY: non-focal  SKIN: No rashes or lesions    LABS:                        12.1   8.5   )-----------( 183      ( 24 Dec 2018 06:53 )             34.5     12-    141  |  106  |  21  ----------------------------<  129<H>  4.6   |  24  |  1.03    Ca    9.3      24 Dec 2018 06:52  Phos  2.7     12-  Mg     1.8     12-    TPro  6.2  /  Alb  3.8  /  TBili  0.6  /  DBili  x   /  AST  11  /  ALT  9<L>  /  AlkPhos  50  12-24    PT/INR - ( 23 Dec 2018 08:17 )   PT: 14.0 sec;   INR: 1.24 ratio               Urinalysis Basic - ( 22 Dec 2018 17:44 )    Color: Yellow / Appearance: Clear / S.021 / pH: x  Gluc: x / Ketone: Negative  / Bili: Negative / Urobili: Negative   Blood: x / Protein: Trace / Nitrite: Negative   Leuk Esterase: Negative / RBC: 2 /hpf / WBC 1 /hpf   Sq Epi: x / Non Sq Epi: 1 /hpf / Bacteria: Negative        RADIOLOGY & ADDITIONAL TESTS:    Imaging Personally Reviewed:  Consultant(s) Notes Reviewed:    Care Discussed with Consultants/Other Providers: Patient is a 85y old  Male who presents with a chief complaint of Pre-syncope (24 Dec 2018 05:18)        SUBJECTIVE / OVERNIGHT EVENTS: no acute complaints.       MEDICATIONS  (STANDING):  amLODIPine   Tablet 10 milliGRAM(s) Oral daily  aspirin enteric coated 81 milliGRAM(s) Oral daily  ATENolol  Tablet 25 milliGRAM(s) Oral daily  atorvastatin 10 milliGRAM(s) Oral at bedtime  dextrose 5%. 1000 milliLiter(s) (50 mL/Hr) IV Continuous <Continuous>  dextrose 50% Injectable 12.5 Gram(s) IV Push once  dextrose 50% Injectable 25 Gram(s) IV Push once  dextrose 50% Injectable 25 Gram(s) IV Push once  finasteride 5 milliGRAM(s) Oral daily  fluticasone propionate 50 MICROgram(s)/spray Nasal Spray 2 Spray(s) Both Nostrils daily  heparin  Injectable 5000 Unit(s) SubCutaneous every 8 hours  insulin lispro (HumaLOG) corrective regimen sliding scale   SubCutaneous three times a day before meals  insulin lispro (HumaLOG) corrective regimen sliding scale   SubCutaneous at bedtime  levothyroxine 125 MICROGram(s) Oral daily  predniSONE   Tablet 5 milliGRAM(s) Oral daily  tamsulosin 0.8 milliGRAM(s) Oral at bedtime    MEDICATIONS  (PRN):  dextrose 40% Gel 15 Gram(s) Oral once PRN Blood Glucose LESS THAN 70 milliGRAM(s)/deciliter  famotidine    Tablet 20 milliGRAM(s) Oral at bedtime PRN GERD  glucagon  Injectable 1 milliGRAM(s) IntraMuscular once PRN Glucose LESS THAN 70 milligrams/deciliter      Vital Signs Last 24 Hrs  T(C): 36.7 (24 Dec 2018 12:11), Max: 36.7 (24 Dec 2018 12:11)  T(F): 98.1 (24 Dec 2018 12:11), Max: 98.1 (24 Dec 2018 12:11)  HR: 54 (24 Dec 2018 12:11) (54 - 69)  BP: 122/65 (24 Dec 2018 12:11) (122/65 - 134/72)  BP(mean): --  RR: 18 (24 Dec 2018 12:11) (18 - 18)  SpO2: 97% (24 Dec 2018 12:11) (96% - 97%)  CAPILLARY BLOOD GLUCOSE      POCT Blood Glucose.: 167 mg/dL (24 Dec 2018 13:20)  POCT Blood Glucose.: 140 mg/dL (24 Dec 2018 09:22)  POCT Blood Glucose.: 139 mg/dL (23 Dec 2018 22:32)  POCT Blood Glucose.: 119 mg/dL (23 Dec 2018 18:08)    I&O's Summary    23 Dec 2018 07:  -  24 Dec 2018 07:00  --------------------------------------------------------  IN: 420 mL / OUT: 825 mL / NET: -405 mL    24 Dec 2018 07:  -  24 Dec 2018 13:50  --------------------------------------------------------  IN: 240 mL / OUT: 0 mL / NET: 240 mL        PHYSICAL EXAM:  GENERAL: NAD, well-developed  HEAD:  Atraumatic, Normocephalic  EYES: EOMI, PERRLA, conjunctiva and sclera clear  NECK: Supple, No JVD  CHEST/LUNG: Clear to auscultation bilaterally; No wheeze  HEART: Regular rate and rhythm; No murmurs, rubs, or gallops  ABDOMEN: Soft, Nontender, Nondistended; Bowel sounds present  EXTREMITIES:  2+ Peripheral Pulses, No clubbing, cyanosis, or edema  PSYCH: AAOx3  NEUROLOGY: non-focal  SKIN: No rashes or lesions    LABS:                        12.1   8.5   )-----------( 183      ( 24 Dec 2018 06:53 )             34.5     12-    141  |  106  |  21  ----------------------------<  129<H>  4.6   |  24  |  1.03    Ca    9.3      24 Dec 2018 06:52  Phos  2.7     12-  Mg     1.8     12-    TPro  6.2  /  Alb  3.8  /  TBili  0.6  /  DBili  x   /  AST  11  /  ALT  9<L>  /  AlkPhos  50  12-24    PT/INR - ( 23 Dec 2018 08:17 )   PT: 14.0 sec;   INR: 1.24 ratio               Urinalysis Basic - ( 22 Dec 2018 17:44 )    Color: Yellow / Appearance: Clear / S.021 / pH: x  Gluc: x / Ketone: Negative  / Bili: Negative / Urobili: Negative   Blood: x / Protein: Trace / Nitrite: Negative   Leuk Esterase: Negative / RBC: 2 /hpf / WBC 1 /hpf   Sq Epi: x / Non Sq Epi: 1 /hpf / Bacteria: Negative        RADIOLOGY & ADDITIONAL TESTS:    Imaging Personally Reviewed: TTE ordered  Consultant(s) Notes Reviewed:    Care Discussed with Consultants/Other Providers: d/w Cardiology - Dr. Benitez regarding plan of care - TTE inpatient prior to discharge

## 2018-12-24 NOTE — DISCHARGE NOTE ADULT - SECONDARY DIAGNOSIS.
ELLIS (acute kidney injury) Weight loss Type 2 diabetes mellitus without complication, without long-term current use of insulin Hypothyroid

## 2018-12-24 NOTE — PROGRESS NOTE ADULT - ATTENDING COMMENTS
Agree with above.   Tele with no events   follow up TTE  no further cardiac workup needed if TTE normal    Mayra Benitez MD
MOHINDER Corbin  pager [phone]
discharge time - 40 minutes  Dr. M. Luke  [facility] Hospitalist  [phone]

## 2018-12-24 NOTE — PROGRESS NOTE ADULT - PROBLEM SELECTOR PLAN 3
Reported 30 lb weight loss in 2 months. Partly intentional, patient wanted to lose weight, but also reports decreased appetite and more significant weight loss than intended. Non-smoker, patient believes that most recent colonoscopy (~8-9 years ago) and PSA are appropriate. May be related to recent stressors of his wife having chronic illness being the primary care-taker.  - Recommend further age-appropriate screening outpatient. discussed with pt - GI for colonoscopy, pmd check psa.   -nutrition consult

## 2018-12-24 NOTE — PROGRESS NOTE ADULT - PROBLEM SELECTOR PLAN 1
Episode of light-headedness and loss of balance requiring patient to sit, associated with an episode of incontinence of which he was unaware but patient otherwise denying symptoms of seizure, post-ictal state, cardiac symptoms, or vertigo. Given ELLIS and recent weight loss with poor PO, suspect possible orthostatic cause. Repeat cardiac enzymes downtrended, EKG wnl. CTH negative.  - orthostatics negative-- s/p 1 L IVF   - monitor on tele so far no events  - EEG pending  - PT eval - no skilled needs  -lower suspicion for cardiac etiologies- will hold off on TTE for now. pt reports normal TTE this year. Episode of light-headedness and loss of balance requiring patient to sit, associated with an episode of incontinence of which he was unaware but patient otherwise denying symptoms of seizure, post-ictal state, cardiac symptoms, or vertigo.  concern for seizure - check EEG  Given ELLIS and recent weight loss with poor PO, suspect possible orthostatic cause which resolved with IVF hydration.   Repeat cardiac enzymes downtrended, EKG wnl. CTH negative.  - orthostatics negative-- s/p 1 L IVF   - monitor on tele so far no events  - EEG pending  - PT eval - no skilled needs  d/w cardiology - check TTE prior to discharge

## 2018-12-24 NOTE — PROGRESS NOTE ADULT - ASSESSMENT
85M h/o CAD (MI in 1993 s/p balloon angioplasty, repeat balloon angioplasty in 1995), BPH, HTN, DM2, hypothyroidism, chronic intermittent prednisone use (prescribed by a Rheumatologist, for intermittent muscle pain bilateral arms, pt unclear on diagnosis), who presents from home s/p episode of pre-syncope and loss of balance associated with an episode of urinary incontinence. Patient's presentation also significant for recent weight loss of 30 lbs over the past 2 months and decreased appetite in the setting of home stressors. 85M h/o CAD (MI in 1993 s/p balloon angioplasty, repeat balloon angioplasty in 1995), BPH, HTN, DM2, hypothyroidism, chronic intermittent prednisone use (prescribed by a Rheumatologist, for intermittent muscle pain bilateral arms, pt unclear on diagnosis), who presents from home s/p episode of pre-syncope and loss of balance associated with an episode of urinary incontinence.  Patient's presentation also significant for recent weight loss of 30 lbs over the past 2 months and decreased appetite in the setting of home stressors.

## 2018-12-24 NOTE — PHYSICAL THERAPY INITIAL EVALUATION ADULT - PERTINENT HX OF CURRENT PROBLEM, REHAB EVAL
86 y/o M presents to [facility] s/p episode of pre-syncope. Pt states that he went to take the garbage outside, and when he was walking back inside he began to feel light-headed & wobbly. Pt rested in sitting & continued to feel light-headed & weak for a few minutes. When his wife came to him he realized that he was wet and had urinated on himself, which he had been unaware.

## 2018-12-24 NOTE — PHYSICAL THERAPY INITIAL EVALUATION ADULT - ADDITIONAL COMMENTS
Prior to admission pt reports being independent of all ADL's & functional mobility. Pt resides in house with spouse, 4 steps to enter (+) rail, 1 flight of stairs up to bedroom, (+) rail.

## 2018-12-24 NOTE — DISCHARGE NOTE ADULT - CARE PLAN
Principal Discharge DX:	Syncope  Goal:	Stable.  Assessment and plan of treatment:	HOME CARE INSTRUCTIONS  Have someone stay with you until you feel stable.  Do not drive, operate machinery, or play sports until your caregiver says it is okay.  Keep all follow-up appointments as directed by your caregiver.   Lie down right away if you start feeling like you might faint. Breathe deeply and steadily. Wait until all the symptoms have passed.Drink enough fluids to keep your urine clear or pale yellow.  If you are taking blood pressure or heart medicine, get up slowly, taking several minutes to sit and then stand. This can reduce dizziness.  SEEK IMMEDIATE MEDICAL CARE IF:  You have a severe headache.  You have unusual pain in the chest, abdomen, or back.  You are bleeding from the mouth or rectum, or you have black or tarry stool.  You have an irregular or very fast heartbeat.  You have pain with breathing.  You have repeated fainting or seizure-like jerking during an episode.  You faint when sitting or lying down.  You have confusion.  You have difficulty walking.  You have severe weakness.  You have vision problems.  If you fainted, call your local emergency services (_____________________). Do not drive yourself to the hospital  Secondary Diagnosis:	ELLIS (acute kidney injury)  Goal:	Stable  Assessment and plan of treatment:	Avoid taking (NSAIDs) - (ex: Ibuprofen, Advil, Celebrex, Naprosyn)  Avoid taking any nephrotoxic agents (can harm kidneys) - Intravenous contrast for diagnostic testing, combination cold medications.  Have all medications adjusted for your renal function by your Health Care Provider.  Blood pressure control is important.  Take all medication as prescribed.  Secondary Diagnosis:	Weight loss  Assessment and plan of treatment:	Eat small frequent meals.   Follow up with PMD in 1 week.  Secondary Diagnosis:	Type 2 diabetes mellitus without complication, without long-term current use of insulin  Goal:	Blood sugar will continue to be well controlled.  Assessment and plan of treatment:	HgA1C this admission.  Make sure you get your HgA1c checked every three months.  If you take oral diabetes medications, check your blood glucose two times a day.  If you take insulin, check your blood glucose before meals and at bedtime.  It's important not to skip any meals.  Keep a log of your blood glucose results and always take it with you to your doctor appointments.  Keep a list of your current medications including injectables and over the counter medications and bring this medication list with you to all your doctor appointments.  If you have not seen your ophthalmologist this year call for appointment.  Check your feet daily for redness, sores, or openings. Do not self treat. If no improvement in two days call your primary care physician for an appointment.  Low blood sugar (hypoglycemia) is a blood sugar below 70mg/dl. Check your blood sugar if you feel signs/symptoms of hypoglycemia. If your blood sugar is below 70 take 15 grams of carbohydrates (ex 4 oz of apple juice, 3-4 glucose tablets, or 4-6 oz of regular soda) wait 15 minutes and repeat blood sugar to make sure it comes up above 70.  If your blood sugar is above 70 and you are due for a meal, have a meal.  If you are not due for a meal have a snack.  This snack helps keeps your blood sugar at a safe range.  Secondary Diagnosis:	Hypothyroid  Goal:	Stable  Assessment and plan of treatment:	Continue present medication regimen.   Follow up in the Endocrine Clinic make an appointment. Principal Discharge DX:	Syncope  Goal:	Stable.  Assessment and plan of treatment:	HOME CARE INSTRUCTIONS  Have someone stay with you until you feel stable.  Do not drive, operate machinery, or play sports until your caregiver says it is okay.  Keep all follow-up appointments as directed by your caregiver.   Lie down right away if you start feeling like you might faint. Breathe deeply and steadily. Wait until all the symptoms have passed.Drink enough fluids to keep your urine clear or pale yellow.  If you are taking blood pressure or heart medicine, get up slowly, taking several minutes to sit and then stand. This can reduce dizziness.  SEEK IMMEDIATE MEDICAL CARE IF:  You have a severe headache.  You have unusual pain in the chest, abdomen, or back.  You are bleeding from the mouth or rectum, or you have black or tarry stool.  You have an irregular or very fast heartbeat.  You have pain with breathing.  You have repeated fainting or seizure-like jerking during an episode.  You faint when sitting or lying down.  You have confusion.  You have difficulty walking.  You have severe weakness.  You have vision problems.  If you fainted, call your local emergency services (_____________________). Do not drive yourself to the hospital  Secondary Diagnosis:	ELLIS (acute kidney injury)  Goal:	Stable  Assessment and plan of treatment:	Avoid taking (NSAIDs) - (ex: Ibuprofen, Advil, Celebrex, Naprosyn)  Avoid taking any nephrotoxic agents (can harm kidneys) - Intravenous contrast for diagnostic testing, combination cold medications.  Have all medications adjusted for your renal function by your Health Care Provider.  Blood pressure control is important.  Take all medication as prescribed.  Secondary Diagnosis:	Weight loss  Goal:	Continue to monitor re: weight loss.  Assessment and plan of treatment:	Eat small frequent meals.   Follow up with PMD in 1 week.  Secondary Diagnosis:	Type 2 diabetes mellitus without complication, without long-term current use of insulin  Goal:	Blood sugar will continue to be well controlled.  Assessment and plan of treatment:	HgA1C this admission.  Make sure you get your HgA1c checked every three months.  If you take oral diabetes medications, check your blood glucose two times a day.  If you take insulin, check your blood glucose before meals and at bedtime.  It's important not to skip any meals.  Keep a log of your blood glucose results and always take it with you to your doctor appointments.  Keep a list of your current medications including injectables and over the counter medications and bring this medication list with you to all your doctor appointments.  If you have not seen your ophthalmologist this year call for appointment.  Check your feet daily for redness, sores, or openings. Do not self treat. If no improvement in two days call your primary care physician for an appointment.  Low blood sugar (hypoglycemia) is a blood sugar below 70mg/dl. Check your blood sugar if you feel signs/symptoms of hypoglycemia. If your blood sugar is below 70 take 15 grams of carbohydrates (ex 4 oz of apple juice, 3-4 glucose tablets, or 4-6 oz of regular soda) wait 15 minutes and repeat blood sugar to make sure it comes up above 70.  If your blood sugar is above 70 and you are due for a meal, have a meal.  If you are not due for a meal have a snack.  This snack helps keeps your blood sugar at a safe range.  Secondary Diagnosis:	Hypothyroid  Goal:	Stable  Assessment and plan of treatment:	Continue present medication regimen.   Follow up in the Endocrine Clinic make an appointment.

## 2018-12-24 NOTE — DISCHARGE NOTE ADULT - MEDICATION SUMMARY - MEDICATIONS TO TAKE
I will START or STAY ON the medications listed below when I get home from the hospital:    finasteride 5 mg oral tablet  -- 1 tab(s) by mouth once a day  -- Indication: For BPH (benign prostatic hyperplasia)    Sindy 5 mg oral delayed release tablet  -- 1 tab(s) by mouth once a day  -- Indication: For Steroid    Aspirin Enteric Coated 81 mg oral delayed release tablet  -- 1 tab(s) by mouth once a day  -- Indication: For CAD (coronary artery disease)    losartan 25 mg oral tablet  -- 1 tab(s) by mouth once a day MDD:1  -- Indication: For HTN (Hypertension)    tamsulosin 0.4 mg oral capsule  -- 2 cap(s) by mouth once a day (at bedtime)  -- Indication: For BPH (benign prostatic hyperplasia)    metFORMIN 500 mg oral tablet  -- 1  by mouth 2 times a day  -- Indication: For Type 2 diabetes mellitus without complication, without long-term current use of insulin    levocetirizine 5 mg oral tablet  -- 1 tab(s) by mouth once a day (in the evening), As Needed  -- Indication: For Allergic Rhinitis    atorvastatin 10 mg oral tablet  -- 1 tab(s) by mouth once a day  -- Indication: For CAD (coronary artery disease)    atenolol 25 mg oral tablet  -- 1 tab(s) by mouth once a day  -- Indication: For HTN (Hypertension)    amLODIPine 10 mg oral tablet  -- 1 tab(s) by mouth once a day  -- Indication: For HTN (Hypertension)    famotidine 40 mg oral tablet  -- 1 tab(s) by mouth once a day (at bedtime), As Needed  -- Indication: For GERDS    fluticasone 50 mcg/inh nasal spray  -- 2 spray(s) into nose once a day, As Needed  -- Indication: For Allergic Rhinitis    levothyroxine 125 mcg (0.125 mg) oral tablet  -- 1 tab(s) by mouth once a day  -- Indication: For Hypothyroid

## 2018-12-24 NOTE — PROVIDER CONTACT NOTE (OTHER) - ASSESSMENT
/72  HR 56, sustaining on tele  Pt asymptomatic, sleeping while HR dropped to 44. Denies any cp, sob, dizziness, lightheadedness, and palpitations.`

## 2018-12-24 NOTE — PROGRESS NOTE ADULT - PROBLEM SELECTOR PLAN 4
No localizing symptoms of infection, negative UA and CXR. No fevers and stable vitals. Likely secondary to chronic steroid use.  - Trend CBC and monitor for signs/symptoms of infection  - C/w home steroids, monitor BP and FS  - Patient reports intermittently using.   AM cortisol wnl.

## 2018-12-24 NOTE — DISCHARGE NOTE ADULT - PATIENT PORTAL LINK FT
You can access the [facility] Patient Portal, offered by [facility], by registering with the following website: [email or web address]

## 2018-12-28 LAB
CULTURE RESULTS: SIGNIFICANT CHANGE UP
CULTURE RESULTS: SIGNIFICANT CHANGE UP
SPECIMEN SOURCE: SIGNIFICANT CHANGE UP
SPECIMEN SOURCE: SIGNIFICANT CHANGE UP

## 2020-04-01 ENCOUNTER — APPOINTMENT (OUTPATIENT)
Dept: DERMATOLOGY | Facility: CLINIC | Age: 85
End: 2020-04-01

## 2020-07-06 NOTE — PATIENT PROFILE ADULT. - NS PRO TALK SOMEONE YN
You can access the FollowMyHealth Patient Portal offered by [facility] by registering at the following website: [email or web address]. By joining [facility]’s FollowMyHealth portal, you will also be able to view your health information using other applications (apps) compatible with our system.
no

## 2021-06-21 NOTE — PROGRESS NOTE ADULT - PROBLEM SELECTOR PLAN 2
Decrease LT4 to 112mcg PO qam.  TSH 0.32  Repeat TFT's as outpt. in 4-6 weeks
Influenza A detected on RVP  - continue Tamiflu 30 mg BID based on CrCl with approximation of patient's weight and height  Supportive care with antipyretics, antitussives, nebs prn  Of note patient on Prednisone 5 mg for chronic back pain 2/2 muscle issue yet no rheumatologic diagnosis. Will continue for now
Cont. LT4 125mcg PO qam (home dose).  TSH ordered
Influenza A detected on RVP  - continue Tamiflu 30 mg BID based on CrCl with approximation of patient's weight and height  -Supportive care with antipyretics, antitussives, nebs prn  -  -Of note patient on Prednisone 5 mg for chronic back pain 2/2 muscle issue yet no rheumatologic diagnosis. Will continue for now
Influenza A detected on RVP  - continue Tamiflu 30 mg BID based on CrCl with approximation of patient's weight and height  Supportive care with antipyretics, antitussives, nebs prn  Of note patient on Prednisone 5 mg for chronic back pain 2/2 muscle issue yet no rheumatologic diagnosis. Will continue for now
Dr. Betty Muñiz MD
Influenza A detected on RVP, symptoms resolved, afebrile for several days, no sob or chest abigail, saturation 97% on RA at rest and on exertion with PT, no desaturation  - continue Tamiflu 30 mg BID (renally dosed) x 5 days total  - will give beta agonist inhaler on discharge to be used prn  -Of note patient on Prednisone 5 mg for chronic back pain 2/2 muscle issue yet no rheumatologic diagnosis, will continue on discharge

## 2021-08-25 PROBLEM — E11.9 TYPE 2 DIABETES MELLITUS WITHOUT COMPLICATIONS: Chronic | Status: ACTIVE | Noted: 2018-12-22

## 2021-09-03 NOTE — DISCHARGE NOTE ADULT - MEDICATION SUMMARY - MEDICATIONS TO CHANGE
[facility] requested an overnight pulse ox for requalifying pt for Noc O2.    I will SWITCH the dose or number of times a day I take the medications listed below when I get home from the hospital:  None

## 2021-09-22 ENCOUNTER — APPOINTMENT (OUTPATIENT)
Dept: MRI IMAGING | Facility: IMAGING CENTER | Age: 86
End: 2021-09-22
Payer: MEDICARE

## 2021-09-22 ENCOUNTER — OUTPATIENT (OUTPATIENT)
Dept: OUTPATIENT SERVICES | Facility: HOSPITAL | Age: 86
LOS: 1 days | End: 2021-09-22
Payer: MEDICARE

## 2021-09-22 DIAGNOSIS — N28.89 OTHER SPECIFIED DISORDERS OF KIDNEY AND URETER: ICD-10-CM

## 2021-09-22 DIAGNOSIS — Z00.8 ENCOUNTER FOR OTHER GENERAL EXAMINATION: ICD-10-CM

## 2021-09-22 PROCEDURE — 74183 MRI ABD W/O CNTR FLWD CNTR: CPT

## 2021-09-22 PROCEDURE — 74183 MRI ABD W/O CNTR FLWD CNTR: CPT | Mod: 26

## 2022-05-18 NOTE — ED ADULT NURSE NOTE - CCCP TRG CHIEF CMPLNT
dizziness
You just had a right lower extremity angiogram and one of the vessels in your leg had an angioplasty (balloon.)  There is a dressing on the left groin with gauze and an adhesive dressing. You should continue to take your aspirin and plavix. You should lay flat for 6 hours in the recovery room. Resume all other medications as you normally take. You may eat a normal diet. No heavy lifting or strenous activity for 6 weeks or as directed by Dr. Gilliland.  Please call the office to make an appointment with Dr. Gilliland in 2 weeks.

## 2023-01-15 NOTE — CONSULT NOTE ADULT - CONSULT REQUESTED BY NAME
Patient refused morning lab draw as reported by night shift RN in care. Patient asleep at this time.   Dr. Vernon

## 2023-09-10 ENCOUNTER — OUTPATIENT (OUTPATIENT)
Dept: OUTPATIENT SERVICES | Facility: HOSPITAL | Age: 88
LOS: 1 days | End: 2023-09-10
Payer: MEDICARE

## 2023-09-10 ENCOUNTER — APPOINTMENT (OUTPATIENT)
Dept: MRI IMAGING | Facility: IMAGING CENTER | Age: 88
End: 2023-09-10
Payer: MEDICARE

## 2023-09-10 DIAGNOSIS — R97.20 ELEVATED PROSTATE SPECIFIC ANTIGEN [PSA]: ICD-10-CM

## 2023-09-10 DIAGNOSIS — Z00.8 ENCOUNTER FOR OTHER GENERAL EXAMINATION: ICD-10-CM

## 2023-09-10 PROCEDURE — 72197 MRI PELVIS W/O & W/DYE: CPT | Mod: 26

## 2023-09-10 PROCEDURE — 76498 UNLISTED MR PROCEDURE: CPT

## 2023-09-10 PROCEDURE — A9585: CPT

## 2023-09-10 PROCEDURE — 76498P: CUSTOM | Mod: 26

## 2023-09-10 PROCEDURE — 72197 MRI PELVIS W/O & W/DYE: CPT

## 2025-04-24 NOTE — PHYSICAL THERAPY INITIAL EVALUATION ADULT - WEIGHT-BEARING RESTRICTIONS: GAIT, REHAB EVAL
"Pt transferred to myself by Susan.    FOLLOW UP: Please advise.     REASON FOR CONVERSATION: ER visit and Abdominal Pain    SYMPTOMS: Sharp, LLQ pain, 6-7/10, that comes and goes every few minutes and worse when seated. Chronic constipation; last BM today.    OTHER: Symptoms began 04/16. Pt evaluated in the ED for this 04/19. CT WNL. Pt reports the above symptoms began today and are worse than on 04/19. Bentyl is not providing relief. Denies N/V, fever, blood with stool.    ED referral for [facility] GI visit. Urgent OV with ELVIS Busch, is not available until 05/23. Please advise if this is ok, or, if OV with a different provider is warranted.    DISPOSITION: Discuss with Provider and Call Back Patient (overriding See Within 2 Weeks in Office)    Reason for Disposition   Abdominal pain is a chronic symptom (recurrent or ongoing AND lasting > 4 weeks)    Answer Assessment - Initial Assessment Questions  1. LOCATION: \"Where does it hurt?\"       LLQ  2. RADIATION: \"Does the pain shoot anywhere else?\" (e.g., chest, back)      Not currently   3. ONSET: \"When did the pain begin?\" (e.g., minutes, hours or days ago)       04/16  5. PATTERN \"Does the pain come and go, or is it constant?\"      Intermittent   6. SEVERITY: \"How bad is the pain?\"  (e.g., Scale 1-10; mild, moderate, or severe)      6-7/10  7. RECURRENT SYMPTOM: \"Have you ever had this type of stomach pain before?\" If Yes, ask: \"When was the last time?\" and \"What happened that time?\"       Unsure   8. CAUSE: \"What do you think is causing the stomach pain?\"      unsure  9. RELIEVING/AGGRAVATING FACTORS: \"What makes it better or worse?\" (e.g., antacids, bending or twisting motion, bowel movement)      Worse when seated  10. OTHER SYMPTOMS: \"Do you have any other symptoms?\" (e.g., back pain, diarrhea, fever, urination pain, vomiting)        Chronic constipation    Protocols used: Abdominal Pain - Female-Adult-[place]    " full weight-bearing

## 2025-05-09 NOTE — PHYSICAL THERAPY INITIAL EVALUATION ADULT - LEVEL OF INDEPENDENCE: SCOOT/BRIDGE, REHAB EVAL
independent dronedarone 400 mg oral tablet: 1 tab(s) orally once a day  Eliquis Starter Pack for Treatment of DVT and PE 5 mg oral tablet: 1 tab(s) orally 2 times a day Starting 5/10 take 10 mg two times a day   Starting 5/15 take 5 mg two times a day  Metoprolol Succinate ER 25 mg oral tablet, extended release: 0.5 tab(s) orally once a day  Synthroid 50 mcg (0.05 mg) oral tablet: 1 tab(s) orally once a day

## 2025-09-11 ENCOUNTER — NON-APPOINTMENT (OUTPATIENT)
Age: OVER 89
End: 2025-09-11

## 2025-09-12 ENCOUNTER — APPOINTMENT (OUTPATIENT)
Dept: ORTHOPEDIC SURGERY | Facility: CLINIC | Age: OVER 89
End: 2025-09-12
Payer: MEDICARE

## 2025-09-12 DIAGNOSIS — M54.50 LOW BACK PAIN, UNSPECIFIED: ICD-10-CM

## 2025-09-12 DIAGNOSIS — M43.16 SPONDYLOLISTHESIS, LUMBAR REGION: ICD-10-CM

## 2025-09-12 DIAGNOSIS — M54.6 PAIN IN THORACIC SPINE: ICD-10-CM

## 2025-09-12 PROCEDURE — 72100 X-RAY EXAM L-S SPINE 2/3 VWS: CPT

## 2025-09-12 PROCEDURE — 99204 OFFICE O/P NEW MOD 45 MIN: CPT
